# Patient Record
Sex: MALE | Race: WHITE | NOT HISPANIC OR LATINO | Employment: OTHER | RURAL
[De-identification: names, ages, dates, MRNs, and addresses within clinical notes are randomized per-mention and may not be internally consistent; named-entity substitution may affect disease eponyms.]

---

## 2020-10-14 ENCOUNTER — OFFICE VISIT (OUTPATIENT)
Dept: PSYCHIATRY | Facility: CLINIC | Age: 35
End: 2020-10-14

## 2020-10-14 VITALS
DIASTOLIC BLOOD PRESSURE: 79 MMHG | HEART RATE: 102 BPM | SYSTOLIC BLOOD PRESSURE: 134 MMHG | TEMPERATURE: 98.2 F | WEIGHT: 215 LBS | HEIGHT: 73 IN | BODY MASS INDEX: 28.49 KG/M2

## 2020-10-14 DIAGNOSIS — F79 INTELLECTUAL DISABILITY: Primary | ICD-10-CM

## 2020-10-14 DIAGNOSIS — F39 UNSPECIFIED MOOD (AFFECTIVE) DISORDER (HCC): ICD-10-CM

## 2020-10-14 DIAGNOSIS — F51.05 INSOMNIA DUE TO MENTAL CONDITION: ICD-10-CM

## 2020-10-14 PROCEDURE — 90792 PSYCH DIAG EVAL W/MED SRVCS: CPT | Performed by: NURSE PRACTITIONER

## 2020-10-14 RX ORDER — TRAZODONE HYDROCHLORIDE 150 MG/1
150 TABLET ORAL NIGHTLY
COMMUNITY
End: 2020-10-14 | Stop reason: SDUPTHER

## 2020-10-14 RX ORDER — ARIPIPRAZOLE 5 MG/1
5 TABLET ORAL DAILY
Qty: 30 TABLET | Refills: 0 | Status: SHIPPED | OUTPATIENT
Start: 2020-10-14 | End: 2020-10-14 | Stop reason: SDUPTHER

## 2020-10-14 RX ORDER — PANTOPRAZOLE SODIUM 40 MG/1
40 TABLET, DELAYED RELEASE ORAL DAILY
COMMUNITY
End: 2021-01-05 | Stop reason: SDUPTHER

## 2020-10-14 RX ORDER — ARIPIPRAZOLE 10 MG/1
20 TABLET ORAL EVERY EVENING
Qty: 30 TABLET | Refills: 0 | Status: SHIPPED | OUTPATIENT
Start: 2020-10-14 | End: 2020-10-14 | Stop reason: SDUPTHER

## 2020-10-14 RX ORDER — ARIPIPRAZOLE 10 MG/1
TABLET ORAL
Qty: 45 TABLET | Refills: 0 | Status: SHIPPED | OUTPATIENT
Start: 2020-10-14 | End: 2020-10-28 | Stop reason: SDUPTHER

## 2020-10-14 RX ORDER — SERTRALINE HYDROCHLORIDE 100 MG/1
100 TABLET, FILM COATED ORAL 2 TIMES DAILY
COMMUNITY
End: 2020-10-14 | Stop reason: SDUPTHER

## 2020-10-14 RX ORDER — SERTRALINE HYDROCHLORIDE 100 MG/1
100 TABLET, FILM COATED ORAL 2 TIMES DAILY
Qty: 60 TABLET | Refills: 0 | Status: SHIPPED | OUTPATIENT
Start: 2020-10-14 | End: 2020-11-18

## 2020-10-14 RX ORDER — ARIPIPRAZOLE 5 MG/1
5 TABLET ORAL 2 TIMES DAILY
COMMUNITY
End: 2020-10-14 | Stop reason: SDUPTHER

## 2020-10-14 RX ORDER — MONTELUKAST SODIUM 10 MG/1
10 TABLET ORAL NIGHTLY
COMMUNITY
End: 2021-01-05 | Stop reason: SDUPTHER

## 2020-10-14 RX ORDER — EZETIMIBE 10 MG/1
10 TABLET ORAL DAILY
COMMUNITY
End: 2020-10-19 | Stop reason: SDUPTHER

## 2020-10-14 RX ORDER — SIMVASTATIN 10 MG
10 TABLET ORAL NIGHTLY
COMMUNITY
End: 2020-10-19 | Stop reason: SDUPTHER

## 2020-10-14 RX ORDER — TRAZODONE HYDROCHLORIDE 150 MG/1
150 TABLET ORAL NIGHTLY
Qty: 30 TABLET | Refills: 0 | Status: SHIPPED | OUTPATIENT
Start: 2020-10-14 | End: 2020-11-25 | Stop reason: SDUPTHER
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
## 2020-10-19 ENCOUNTER — OFFICE VISIT (OUTPATIENT)
Dept: FAMILY MEDICINE CLINIC | Facility: CLINIC | Age: 35
End: 2020-10-19

## 2020-10-19 ENCOUNTER — TELEPHONE (OUTPATIENT)
Dept: FAMILY MEDICINE CLINIC | Facility: CLINIC | Age: 35
End: 2020-10-19

## 2020-10-19 VITALS
RESPIRATION RATE: 18 BRPM | HEIGHT: 72 IN | DIASTOLIC BLOOD PRESSURE: 90 MMHG | OXYGEN SATURATION: 97 % | HEART RATE: 94 BPM | WEIGHT: 215 LBS | BODY MASS INDEX: 29.12 KG/M2 | SYSTOLIC BLOOD PRESSURE: 142 MMHG | TEMPERATURE: 98.6 F

## 2020-10-19 DIAGNOSIS — J45.990 EXERCISE-INDUCED ASTHMA: ICD-10-CM

## 2020-10-19 DIAGNOSIS — J30.2 SEASONAL ALLERGIES: ICD-10-CM

## 2020-10-19 DIAGNOSIS — E78.2 MIXED HYPERLIPIDEMIA: Primary | ICD-10-CM

## 2020-10-19 DIAGNOSIS — Z13.29 SCREENING FOR THYROID DISORDER: ICD-10-CM

## 2020-10-19 PROCEDURE — 99204 OFFICE O/P NEW MOD 45 MIN: CPT | Performed by: NURSE PRACTITIONER

## 2020-10-19 PROCEDURE — 36415 COLL VENOUS BLD VENIPUNCTURE: CPT | Performed by: NURSE PRACTITIONER

## 2020-10-19 PROCEDURE — 84443 ASSAY THYROID STIM HORMONE: CPT | Performed by: NURSE PRACTITIONER

## 2020-10-19 PROCEDURE — 85025 COMPLETE CBC W/AUTO DIFF WBC: CPT | Performed by: NURSE PRACTITIONER

## 2020-10-19 PROCEDURE — 80053 COMPREHEN METABOLIC PANEL: CPT | Performed by: NURSE PRACTITIONER

## 2020-10-19 PROCEDURE — 80061 LIPID PANEL: CPT | Performed by: NURSE PRACTITIONER

## 2020-10-19 RX ORDER — SIMVASTATIN 10 MG
10 TABLET ORAL NIGHTLY
Qty: 30 TABLET | Refills: 2 | Status: SHIPPED | OUTPATIENT
Start: 2020-10-19 | End: 2021-01-05 | Stop reason: SDUPTHER

## 2020-10-19 RX ORDER — FLUTICASONE PROPIONATE 50 MCG
2 SPRAY, SUSPENSION (ML) NASAL DAILY
Qty: 18.2 ML | Refills: 2 | Status: SHIPPED | OUTPATIENT
Start: 2020-10-19 | End: 2021-01-05 | Stop reason: SDUPTHER

## 2020-10-19 RX ORDER — EZETIMIBE 10 MG/1
10 TABLET ORAL DAILY
Qty: 30 TABLET | Refills: 2 | Status: SHIPPED | OUTPATIENT
Start: 2020-10-19 | End: 2021-01-05 | Stop reason: SDUPTHER

## 2020-10-19 RX ORDER — ALBUTEROL SULFATE 90 UG/1
2 AEROSOL, METERED RESPIRATORY (INHALATION) EVERY 4 HOURS PRN
Qty: 18 G | Refills: 2 | Status: SHIPPED | OUTPATIENT
Start: 2020-10-19 | End: 2021-01-05 | Stop reason: SDUPTHER

## 2020-10-19 NOTE — PROGRESS NOTES
Subjective   Nelson Callahan is a 35 y.o. male.     Chief Complaint   Patient presents with   • Hyperlipidemia       He presents seeking a primary care provider. He states he has had several primary care providers in the past because he has been all over [place]. He presents for initial evaluation of existing diagnoses mixed hyperlipidemia, asthma, and seasonal allergies. He states he has been treated for high cholesterol for about 10 years. He reports good compliance with medications. He denies chest pain and palpitations. He states he has always had asthma. He states he has not had an inhaler or nasal spray for about 6 months. He c/o exercise induced asthma.        The following portions of the patient's history were reviewed and updated as appropriate: allergies, current medications, past family history, past medical history, past social history, past surgical history and problem list.    Review of Systems   Constitutional: Negative for fever and unexpected weight change.   HENT: Negative for ear pain, rhinorrhea, sore throat and trouble swallowing.    Eyes: Negative for visual disturbance.   Respiratory: Positive for cough. Negative for shortness of breath and wheezing.    Cardiovascular: Negative for chest pain and palpitations.   Gastrointestinal: Negative for abdominal pain, blood in stool, constipation, diarrhea, nausea and vomiting.   Genitourinary: Negative for dysuria and hematuria.   Musculoskeletal: Negative for arthralgias and myalgias.   Skin: Negative for color change.   Allergic/Immunologic: Negative for environmental allergies.   Neurological: Negative for dizziness, seizures, syncope and headaches.   Hematological: Negative for adenopathy.   Psychiatric/Behavioral: Negative for sleep disturbance and suicidal ideas. The patient is not nervous/anxious.        Objective     /90 (BP Location: Right arm, Patient Position: Sitting)   Pulse 94   Temp 98.6 °F (37 °C) (Temporal)   Resp 18   Ht  "182.9 cm (72\")   Wt 97.5 kg (215 lb)   SpO2 97%   BMI 29.16 kg/m²     Physical Exam  Vitals signs reviewed.   Constitutional:       General: He is not in acute distress.     Appearance: He is well-developed. He is not diaphoretic.   HENT:      Head: Normocephalic and atraumatic.      Right Ear: Hearing, tympanic membrane, ear canal and external ear normal.      Left Ear: Hearing, tympanic membrane, ear canal and external ear normal.      Nose: Nose normal.      Right Sinus: No maxillary sinus tenderness or frontal sinus tenderness.      Left Sinus: No maxillary sinus tenderness or frontal sinus tenderness.      Mouth/Throat:      Pharynx: Uvula midline.   Eyes:      General: Lids are normal.      Conjunctiva/sclera: Conjunctivae normal.      Pupils: Pupils are equal, round, and reactive to light.   Neck:      Trachea: Trachea normal. No tracheal tenderness or tracheal deviation.   Cardiovascular:      Rate and Rhythm: Normal rate and regular rhythm.      Heart sounds: Normal heart sounds, S1 normal and S2 normal. No murmur. No friction rub. No gallop.    Pulmonary:      Effort: Pulmonary effort is normal. No respiratory distress.      Breath sounds: Normal breath sounds.   Abdominal:      General: Bowel sounds are normal. There is no distension.      Palpations: Abdomen is soft.      Tenderness: There is no abdominal tenderness.   Skin:     General: Skin is warm and dry.   Neurological:      Mental Status: He is alert and oriented to person, place, and time.   Psychiatric:         Behavior: Behavior normal.         Thought Content: Thought content normal.         Judgment: Judgment normal.         Assessment/Plan     Problem List Items Addressed This Visit     None      Visit Diagnoses     Mixed hyperlipidemia    -  Primary    Relevant Medications    ezetimibe (ZETIA) 10 MG tablet    simvastatin (ZOCOR) 10 MG tablet    Other Relevant Orders    CBC & Differential    Comprehensive Metabolic Panel    Lipid Panel    " CBC Auto Differential    Screening for thyroid disorder        Relevant Orders    TSH    Exercise-induced asthma        Relevant Medications    albuterol sulfate  (90 Base) MCG/ACT inhaler    Seasonal allergies        Relevant Medications    fluticasone (Flonase) 50 MCG/ACT nasal spray          Plan: Get baseline labs today. Continue current medications. Albuterol ordered as needed for asthma. Flonase ordered for allergies. Follow up in 6 months and as needed. He declines flu vaccine.    Patient's Body mass index is 29.16 kg/m². BMI is above normal parameters. Recommendations include: educational material.   (Normal BMI:  18.5-24.9, OW 25-29.9, Obesity 30 or greater)    Nelson Callahan  reports that he has been smoking cigarettes. He has a 7.00 pack-year smoking history. He has never used smokeless tobacco.. I have educated him on the risk of diseases from using tobacco products such as cancer, COPD and heart disease.     I advised him to quit and he is not willing to quit.    I spent 1 minutes counseling the patient.           Understands disease processes and need for medications.  Understands reasons for urgent and emergent care.  Patient (& family) verbalized agreement for treatment plan.   Emotional support and active listening provided.  Patient provided time to verbalize feelings.               This document has been electronically signed by CHARLIE Salinas   October 19, 2020 11:28 EDT

## 2020-10-19 NOTE — PATIENT INSTRUCTIONS

## 2020-10-20 LAB
ALBUMIN SERPL-MCNC: 4.7 G/DL (ref 3.5–5.2)
ALBUMIN/GLOB SERPL: 1.7 G/DL
ALP SERPL-CCNC: 44 U/L (ref 39–117)
ALT SERPL W P-5'-P-CCNC: 74 U/L (ref 1–41)
ANION GAP SERPL CALCULATED.3IONS-SCNC: 8.9 MMOL/L (ref 5–15)
AST SERPL-CCNC: 35 U/L (ref 1–40)
BASOPHILS # BLD AUTO: 0.01 10*3/MM3 (ref 0–0.2)
BASOPHILS NFR BLD AUTO: 0.2 % (ref 0–1.5)
BILIRUB SERPL-MCNC: 0.6 MG/DL (ref 0–1.2)
BUN SERPL-MCNC: 9 MG/DL (ref 6–20)
BUN/CREAT SERPL: 10.7 (ref 7–25)
CALCIUM SPEC-SCNC: 9.9 MG/DL (ref 8.6–10.5)
CHLORIDE SERPL-SCNC: 102 MMOL/L (ref 98–107)
CHOLEST SERPL-MCNC: 136 MG/DL (ref 0–200)
CO2 SERPL-SCNC: 30.1 MMOL/L (ref 22–29)
CREAT SERPL-MCNC: 0.84 MG/DL (ref 0.76–1.27)
DEPRECATED RDW RBC AUTO: 43.4 FL (ref 37–54)
EOSINOPHIL # BLD AUTO: 0.02 10*3/MM3 (ref 0–0.4)
EOSINOPHIL NFR BLD AUTO: 0.4 % (ref 0.3–6.2)
ERYTHROCYTE [DISTWIDTH] IN BLOOD BY AUTOMATED COUNT: 11.9 % (ref 12.3–15.4)
GFR SERPL CREATININE-BSD FRML MDRD: 105 ML/MIN/1.73
GLOBULIN UR ELPH-MCNC: 2.8 GM/DL
GLUCOSE SERPL-MCNC: 88 MG/DL (ref 65–99)
HCT VFR BLD AUTO: 46.2 % (ref 37.5–51)
HDLC SERPL-MCNC: 56 MG/DL (ref 40–60)
HGB BLD-MCNC: 16.4 G/DL (ref 13–17.7)
IMM GRANULOCYTES # BLD AUTO: 0.01 10*3/MM3 (ref 0–0.05)
IMM GRANULOCYTES NFR BLD AUTO: 0.2 % (ref 0–0.5)
LDLC SERPL CALC-MCNC: 71 MG/DL (ref 0–100)
LDLC/HDLC SERPL: 1.31 {RATIO}
LYMPHOCYTES # BLD AUTO: 1.28 10*3/MM3 (ref 0.7–3.1)
LYMPHOCYTES NFR BLD AUTO: 23.1 % (ref 19.6–45.3)
MCH RBC QN AUTO: 34.6 PG (ref 26.6–33)
MCHC RBC AUTO-ENTMCNC: 35.5 G/DL (ref 31.5–35.7)
MCV RBC AUTO: 97.5 FL (ref 79–97)
MONOCYTES # BLD AUTO: 0.39 10*3/MM3 (ref 0.1–0.9)
MONOCYTES NFR BLD AUTO: 7 % (ref 5–12)
NEUTROPHILS NFR BLD AUTO: 3.83 10*3/MM3 (ref 1.7–7)
NEUTROPHILS NFR BLD AUTO: 69.1 % (ref 42.7–76)
NRBC BLD AUTO-RTO: 0 /100 WBC (ref 0–0.2)
PLATELET # BLD AUTO: 214 10*3/MM3 (ref 140–450)
PMV BLD AUTO: 10.4 FL (ref 6–12)
POTASSIUM SERPL-SCNC: 5.2 MMOL/L (ref 3.5–5.2)
PROT SERPL-MCNC: 7.5 G/DL (ref 6–8.5)
RBC # BLD AUTO: 4.74 10*6/MM3 (ref 4.14–5.8)
SODIUM SERPL-SCNC: 141 MMOL/L (ref 136–145)
TRIGL SERPL-MCNC: 34 MG/DL (ref 0–150)
TSH SERPL DL<=0.05 MIU/L-ACNC: 0.91 UIU/ML (ref 0.27–4.2)
VLDLC SERPL-MCNC: 9 MG/DL (ref 5–40)
WBC # BLD AUTO: 5.54 10*3/MM3 (ref 3.4–10.8)

## 2020-10-20 NOTE — PROGRESS NOTES
His liver enzymes are slightly elevated. Has he ever had a problem with this before. Otherwise his labs look good.

## 2020-10-21 NOTE — TELEPHONE ENCOUNTER
We can talk about the increase at his next visit with me because 10 mg twice daily is a significant jump in medication.  Thank you

## 2020-10-22 NOTE — TELEPHONE ENCOUNTER
PATIENTS MOM RETURNED CALL, SHE IS NOT ON [facility], SHE WAS NOTIFIED TO HAVE PATIENT RETURN CALL.

## 2020-10-28 ENCOUNTER — TELEPHONE (OUTPATIENT)
Dept: FAMILY MEDICINE CLINIC | Facility: CLINIC | Age: 35
End: 2020-10-28

## 2020-10-28 RX ORDER — ARIPIPRAZOLE 20 MG/1
20 TABLET ORAL NIGHTLY
Qty: 30 TABLET | Refills: 0 | Status: SHIPPED | OUTPATIENT
Start: 2020-10-28 | End: 2020-11-25 | Stop reason: SDUPTHER

## 2020-10-28 NOTE — TELEPHONE ENCOUNTER
PATIENTS MOTHER NAZ CALLED THAT HIS HIS ABLIFY IS STILL MESSED UP; SHE SAID INSURANCE WONT PAY FOR ONE IN THE MORNING, AND HALF IN THE EVENING, THEY WILL ONLY PAY FOR 10 MG OR THE 20 MG    PLEASE ADVISE DONNIE: [phone]      WILBERT ALEJANDRO  [address] [place]

## 2020-11-18 RX ORDER — SERTRALINE HYDROCHLORIDE 100 MG/1
TABLET, FILM COATED ORAL
Qty: 60 TABLET | Refills: 0 | Status: SHIPPED | OUTPATIENT
Start: 2020-11-18 | End: 2020-11-25 | Stop reason: SDUPTHER

## 2020-11-25 ENCOUNTER — OFFICE VISIT (OUTPATIENT)
Dept: PSYCHIATRY | Facility: CLINIC | Age: 35
End: 2020-11-25

## 2020-11-25 VITALS
HEART RATE: 80 BPM | TEMPERATURE: 99 F | DIASTOLIC BLOOD PRESSURE: 90 MMHG | SYSTOLIC BLOOD PRESSURE: 150 MMHG | BODY MASS INDEX: 29.93 KG/M2 | HEIGHT: 72 IN | WEIGHT: 221 LBS

## 2020-11-25 DIAGNOSIS — F51.05 INSOMNIA DUE TO MENTAL CONDITION: ICD-10-CM

## 2020-11-25 DIAGNOSIS — F39 UNSPECIFIED MOOD (AFFECTIVE) DISORDER (HCC): ICD-10-CM

## 2020-11-25 DIAGNOSIS — F79 INTELLECTUAL DISABILITY: Primary | ICD-10-CM

## 2020-11-25 PROCEDURE — 99214 OFFICE O/P EST MOD 30 MIN: CPT | Performed by: NURSE PRACTITIONER

## 2020-11-25 RX ORDER — ARIPIPRAZOLE 20 MG/1
20 TABLET ORAL NIGHTLY
Qty: 30 TABLET | Refills: 1 | Status: SHIPPED | OUTPATIENT
Start: 2020-11-25 | End: 2021-01-13 | Stop reason: SDDI

## 2020-11-25 RX ORDER — SERTRALINE HYDROCHLORIDE 100 MG/1
100 TABLET, FILM COATED ORAL 2 TIMES DAILY
Qty: 60 TABLET | Refills: 1 | Status: SHIPPED | OUTPATIENT
Start: 2020-11-25 | End: 2021-01-13 | Stop reason: SDUPTHER

## 2020-11-25 RX ORDER — TRAZODONE HYDROCHLORIDE 150 MG/1
150 TABLET ORAL NIGHTLY
Qty: 30 TABLET | Refills: 1 | Status: SHIPPED | OUTPATIENT
Start: 2020-11-25 | End: 2021-01-13 | Stop reason: SDUPTHER

## 2020-11-25 NOTE — PROGRESS NOTES
Subjective   Nelson Callahan is a 35 y.o. male is here today for medication management follow-up at [facility].    Chief Complaint: MDD, anxiety, ID    History of Present Illness  He states that he is doing a lot better but that the pharmacy got a little confused about the Abilify dosing, recommended that he cut the Abilify in half and take twice daily.  He states that he is taking his medications as prescribed with on SE as gaining weight with about 6 pound weight gain since last visit.  He states that his appetite is good and that he is eating well.  He rates his depression about 0/10, and anxiety about 3/10 with 10 being the worse, he states that his anxiety only rises when he goes into specific places but it has gotten better.  He states that he is having issues with staying asleep, waking up about every 2 hours, recommended that he attempt to take the trazodone a little later in the evening.  He states that he is getting about 8 hours of sleep per night with no NM.  He denies any acute health issues, he states that he is stressed out about getting people to work with him.  He is trying to get people to work with the Peer support which can be stressful.  Denies any AV hallucinations, denies any SI/HI.      The following portions of the patient's history were reviewed and updated as appropriate: allergies, current medications, past family history, past medical history, past social history, past surgical history and problem list.    Review of Systems   Constitutional: Negative for appetite change, chills, diaphoresis, fatigue, fever and unexpected weight change.   HENT: Negative for hearing loss, sore throat, trouble swallowing and voice change.    Eyes: Negative for photophobia and visual disturbance.   Respiratory: Negative for cough, chest tightness and shortness of breath.    Cardiovascular: Negative for chest pain and palpitations.   Gastrointestinal: Negative for abdominal pain,  "constipation, nausea and vomiting.   Endocrine: Negative for cold intolerance and heat intolerance.   Genitourinary: Negative for dysuria and frequency.   Musculoskeletal: Negative for arthralgias, back pain, joint swelling and neck stiffness.   Skin: Negative for color change and wound.   Allergic/Immunologic: Negative for environmental allergies and immunocompromised state.   Neurological: Negative for dizziness, tremors, seizures, syncope, weakness, light-headedness and headaches.   Hematological: Negative for adenopathy. Does not bruise/bleed easily.       Objective   Physical Exam  Vitals signs reviewed.   Constitutional:       General: He is not in acute distress.     Appearance: He is well-developed.   Neurological:      Mental Status: He is alert.      Coordination: Coordination normal.      Gait: Gait normal.       Blood pressure 150/90, pulse 80, temperature 99 °F (37.2 °C), height 182.9 cm (72\"), weight 100 kg (221 lb). Body mass index is 29.97 kg/m².    Medication List:   Current Outpatient Medications   Medication Sig Dispense Refill   • albuterol sulfate  (90 Base) MCG/ACT inhaler Inhale 2 puffs Every 4 (Four) Hours As Needed for Wheezing. 18 g 2   • ARIPiprazole (ABILIFY) 20 MG tablet Take 1 tablet by mouth Every Night. 30 tablet 1   • ezetimibe (ZETIA) 10 MG tablet Take 1 tablet by mouth Daily. 30 tablet 2   • fluticasone (Flonase) 50 MCG/ACT nasal spray 2 sprays into the nostril(s) as directed by provider Daily. 18.2 mL 2   • montelukast (SINGULAIR) 10 MG tablet Take 10 mg by mouth Every Night.     • pantoprazole (PROTONIX) 40 MG EC tablet Take 40 mg by mouth Daily.     • sertraline (ZOLOFT) 100 MG tablet Take 1 tablet by mouth 2 (Two) Times a Day. 60 tablet 1   • simvastatin (ZOCOR) 10 MG tablet Take 1 tablet by mouth Every Night. 30 tablet 2   • traZODone (DESYREL) 150 MG tablet Take 1 tablet by mouth Every Night. 30 tablet 1     No current facility-administered medications for this visit.  "       Mental Status Exam:   Hygiene:   fair  Cooperation:  Cooperative  Eye Contact:  Fair  Psychomotor Behavior:  Appropriate  Affect:  Appropriate  Hopelessness: Denies  Speech:  Minimal  Thought Process:  Linear  Thought Content:  Mood congruent  Suicidal:  None  Homicidal:  None  Hallucinations:  None  Delusion:  None  Memory:  Intact  Orientation:  Person, Place, Time and Situation  Reliability:  fair  Insight:  Fair  Judgement:  Fair  Impulse Control:  Fair  Physical/Medical Issues:  No     Assessment/Plan   Problems Addressed this Visit     None      Visit Diagnoses     Intellectual disability    -  Primary    Relevant Medications    ARIPiprazole (ABILIFY) 20 MG tablet    sertraline (ZOLOFT) 100 MG tablet    traZODone (DESYREL) 150 MG tablet    Unspecified mood (affective) disorder (CMS/HCC)        Relevant Medications    ARIPiprazole (ABILIFY) 20 MG tablet    sertraline (ZOLOFT) 100 MG tablet    traZODone (DESYREL) 150 MG tablet    Insomnia due to mental condition        Relevant Medications    ARIPiprazole (ABILIFY) 20 MG tablet    sertraline (ZOLOFT) 100 MG tablet    traZODone (DESYREL) 150 MG tablet      Diagnoses       Codes Comments    Intellectual disability    -  Primary ICD-10-CM: F79  ICD-9-CM: 319     Unspecified mood (affective) disorder (CMS/HCC)     ICD-10-CM: F39  ICD-9-CM: 296.90     Insomnia due to mental condition     ICD-10-CM: F51.05  ICD-9-CM: 300.9, 327.02         Discussed medication options. Continue abilify for mood, zoloft for depression and anxiety, and trazodone for sleep.   Reviewed the risks, benefits, and side effects of the medications; patient acknowledged and verbally consented.  Patient is agreeable to call the [facility] with any worsening of symptoms.   Patient is aware to call 911 or go to the nearest ER should begin having SI/HI.     Prognosis: Guarded dependent on medication, follow up appointment and treatment plan compliance     Functionality:  Fair.  Symptoms  have improved with periodic episodes of anxiety when interacting with the public.    Return in 6 weeks.

## 2020-12-21 ENCOUNTER — TELEPHONE (OUTPATIENT)
Dept: FAMILY MEDICINE CLINIC | Facility: CLINIC | Age: 35
End: 2020-12-21

## 2020-12-21 NOTE — TELEPHONE ENCOUNTER
PT STATES THAT THE MEDICATION IS CAUSING HIM TO GAIN WEIGHT.  PT STATES THAT HE GAINED ABOUT 50 POUNDS IN 2 MONTHS    ARIPiprazole (ABILIFY) 20 MG tablet    PT STATES THAT HE WOULD LIKE TO STOP TAKING THAT MEDICATION AND START VYVANSE AND LATUDA.      PHARMACY CONFIRMED  [id number] - [place], [place] - [address] AT [facility] [address] - [phone] PH - [phone] FX      PT STATES THAT IF HE IS NOT AVAILABLE THAT IT IS OK FOR MOTHER NAZ SOLANO TO TALK FOR HIM.      PLEASE ADVISE  [phone]

## 2020-12-22 NOTE — TELEPHONE ENCOUNTER
He can stop the abilify if he would like to but I am not sending in any new medications until I discuss it with him in January.  Plus, unless there is confirmed ADHD he will not be prescribed vyvanse.  Thank you.

## 2020-12-23 ENCOUNTER — TELEPHONE (OUTPATIENT)
Dept: FAMILY MEDICINE CLINIC | Facility: CLINIC | Age: 35
End: 2020-12-23

## 2020-12-23 NOTE — TELEPHONE ENCOUNTER
The Abilify and trazodone shouldn't be causing him to gain that much weight, maybe he is having fluid buildup from somewhere.  He may need to see his PCP should this continue.  Also, he can just discontinue the Abilify until I see him.

## 2020-12-23 NOTE — TELEPHONE ENCOUNTER
"Patient states that he was wanting to see why he would be gaining 5-6 pounds daily. He states that he has decreased his Trazodone and Abilify to \"only taking a crumb.\" States medications makes him \"ill\" as well.   "

## 2020-12-23 NOTE — TELEPHONE ENCOUNTER
Patient is aware to follow up with PCP regarding weight gain. He is also aware to stop the Abilify until he sees you next.

## 2021-01-05 ENCOUNTER — OFFICE VISIT (OUTPATIENT)
Dept: FAMILY MEDICINE CLINIC | Facility: CLINIC | Age: 36
End: 2021-01-05

## 2021-01-05 VITALS
SYSTOLIC BLOOD PRESSURE: 143 MMHG | DIASTOLIC BLOOD PRESSURE: 91 MMHG | HEIGHT: 72 IN | HEART RATE: 96 BPM | OXYGEN SATURATION: 98 % | TEMPERATURE: 98.4 F | BODY MASS INDEX: 31.69 KG/M2 | WEIGHT: 234 LBS | RESPIRATION RATE: 16 BRPM

## 2021-01-05 DIAGNOSIS — K21.9 GASTROESOPHAGEAL REFLUX DISEASE, UNSPECIFIED WHETHER ESOPHAGITIS PRESENT: Primary | Chronic | ICD-10-CM

## 2021-01-05 DIAGNOSIS — E78.2 MIXED HYPERLIPIDEMIA: Chronic | ICD-10-CM

## 2021-01-05 DIAGNOSIS — J45.990 EXERCISE-INDUCED ASTHMA: Chronic | ICD-10-CM

## 2021-01-05 DIAGNOSIS — J30.2 SEASONAL ALLERGIES: Chronic | ICD-10-CM

## 2021-01-05 PROCEDURE — 99214 OFFICE O/P EST MOD 30 MIN: CPT | Performed by: NURSE PRACTITIONER

## 2021-01-05 RX ORDER — PANTOPRAZOLE SODIUM 40 MG/1
40 TABLET, DELAYED RELEASE ORAL DAILY
Qty: 30 TABLET | Refills: 5 | Status: SHIPPED | OUTPATIENT
Start: 2021-01-05 | End: 2021-04-14 | Stop reason: SDUPTHER

## 2021-01-05 RX ORDER — SIMVASTATIN 10 MG
10 TABLET ORAL NIGHTLY
Qty: 30 TABLET | Refills: 2 | Status: SHIPPED | OUTPATIENT
Start: 2021-01-05 | End: 2021-04-14 | Stop reason: SDUPTHER

## 2021-01-05 RX ORDER — FLUTICASONE PROPIONATE 50 MCG
2 SPRAY, SUSPENSION (ML) NASAL DAILY
Qty: 18.2 ML | Refills: 2 | Status: SHIPPED | OUTPATIENT
Start: 2021-01-05 | End: 2021-04-14 | Stop reason: SDUPTHER

## 2021-01-05 RX ORDER — ALBUTEROL SULFATE 90 UG/1
2 AEROSOL, METERED RESPIRATORY (INHALATION) EVERY 4 HOURS PRN
Qty: 18 G | Refills: 2 | Status: SHIPPED | OUTPATIENT
Start: 2021-01-05 | End: 2021-04-14 | Stop reason: SDUPTHER

## 2021-01-05 RX ORDER — MONTELUKAST SODIUM 10 MG/1
10 TABLET ORAL NIGHTLY
Qty: 30 TABLET | Refills: 5 | Status: SHIPPED | OUTPATIENT
Start: 2021-01-05 | End: 2021-03-12 | Stop reason: HOSPADM

## 2021-01-05 RX ORDER — EZETIMIBE 10 MG/1
10 TABLET ORAL DAILY
Qty: 30 TABLET | Refills: 2 | Status: SHIPPED | OUTPATIENT
Start: 2021-01-05 | End: 2021-04-14 | Stop reason: SDUPTHER

## 2021-01-05 NOTE — PROGRESS NOTES
"Subjective   Nelson Callahan is a 35 y.o. male.     Chief Complaint   Patient presents with   • Hyperlipidemia       He presents for follow up of mixed hyperlipidemia, intermittent asthma, and gerd. He reports good tolerance and compliance with medications. He c/o some weight gain related to staying in the house a lot due to the pandemic.        The following portions of the patient's history were reviewed and updated as appropriate: allergies, current medications, past family history, past medical history, past social history, past surgical history and problem list.    Review of Systems   Constitutional: Negative for fever and unexpected weight change.   HENT: Negative for ear pain, rhinorrhea, sore throat and trouble swallowing.    Eyes: Negative for visual disturbance.   Respiratory: Negative for cough, shortness of breath and wheezing.    Cardiovascular: Negative for chest pain and palpitations.   Gastrointestinal: Negative for abdominal pain, blood in stool, constipation, diarrhea, nausea and vomiting.   Genitourinary: Negative for dysuria and hematuria.   Musculoskeletal: Negative for arthralgias and myalgias.   Skin: Negative for color change.   Allergic/Immunologic: Negative for environmental allergies.   Neurological: Negative for dizziness, seizures, syncope and headaches.   Hematological: Negative for adenopathy.   Psychiatric/Behavioral: Negative for sleep disturbance and suicidal ideas. The patient is not nervous/anxious.        Objective     /91 (BP Location: Right arm, Patient Position: Sitting, Cuff Size: Adult)   Pulse 96   Temp 98.4 °F (36.9 °C) (Temporal)   Resp 16   Ht 182.9 cm (72.01\")   Wt 106 kg (234 lb)   SpO2 98%   BMI 31.73 kg/m²     Physical Exam  Vitals signs reviewed.   Constitutional:       General: He is not in acute distress.     Appearance: He is well-developed. He is not diaphoretic.   HENT:      Head: Normocephalic and atraumatic.   Cardiovascular:      Rate and " Rhythm: Normal rate and regular rhythm.      Heart sounds: Normal heart sounds. No murmur. No friction rub. No gallop.    Pulmonary:      Effort: Pulmonary effort is normal. No respiratory distress.      Breath sounds: Normal breath sounds.   Abdominal:      General: Bowel sounds are normal. There is no distension.      Palpations: Abdomen is soft.      Tenderness: There is no abdominal tenderness.   Skin:     General: Skin is warm and dry.   Neurological:      Mental Status: He is alert and oriented to person, place, and time.   Psychiatric:         Behavior: Behavior normal.         Thought Content: Thought content normal.         Judgment: Judgment normal.         Assessment/Plan     Problem List Items Addressed This Visit     None      Visit Diagnoses     Gastroesophageal reflux disease, unspecified whether esophagitis present    -  Primary    Relevant Medications    pantoprazole (PROTONIX) 40 MG EC tablet    Mixed hyperlipidemia        Relevant Medications    simvastatin (ZOCOR) 10 MG tablet    ezetimibe (ZETIA) 10 MG tablet    Exercise-induced asthma        Relevant Medications    albuterol sulfate  (90 Base) MCG/ACT inhaler    montelukast (SINGULAIR) 10 MG tablet    Seasonal allergies        Relevant Medications    fluticasone (Flonase) 50 MCG/ACT nasal spray    montelukast (SINGULAIR) 10 MG tablet          Plan: Continue current medications. Continue social distancing. Keep scheduled follow up. He declines flu vaccine.     Patient's Body mass index is 31.73 kg/m². BMI is above normal parameters. Recommendations include: educational material.   (Normal BMI:  18.5-24.9, OW 25-29.9, Obesity 30 or greater)    Nelson COSME Callahan  reports that he has been smoking cigarettes. He has a 7.00 pack-year smoking history. He has never used smokeless tobacco.. I have educated him on the risk of diseases from using tobacco products such as cancer, COPD and heart disease.     I advised him to quit and he is not  willing to quit.    I spent 1 minutes counseling the patient.           Understands disease processes and need for medications.  Understands reasons for urgent and emergent care.  Patient (& family) verbalized agreement for treatment plan.   Emotional support and active listening provided.  Patient provided time to verbalize feelings.               This document has been electronically signed by CHARLIE Salinas   January 5, 2021 09:41 EST

## 2021-01-05 NOTE — PATIENT INSTRUCTIONS

## 2021-01-13 ENCOUNTER — OFFICE VISIT (OUTPATIENT)
Dept: PSYCHIATRY | Facility: CLINIC | Age: 36
End: 2021-01-13

## 2021-01-13 DIAGNOSIS — F51.05 INSOMNIA DUE TO MENTAL CONDITION: ICD-10-CM

## 2021-01-13 DIAGNOSIS — F79 INTELLECTUAL DISABILITY: Primary | ICD-10-CM

## 2021-01-13 DIAGNOSIS — F41.1 GENERALIZED ANXIETY DISORDER: ICD-10-CM

## 2021-01-13 DIAGNOSIS — F39 UNSPECIFIED MOOD (AFFECTIVE) DISORDER (HCC): ICD-10-CM

## 2021-01-13 PROCEDURE — 99214 OFFICE O/P EST MOD 30 MIN: CPT | Performed by: NURSE PRACTITIONER

## 2021-01-13 RX ORDER — SERTRALINE HYDROCHLORIDE 100 MG/1
100 TABLET, FILM COATED ORAL 2 TIMES DAILY
Qty: 60 TABLET | Refills: 1 | Status: ON HOLD | OUTPATIENT
Start: 2021-01-13 | End: 2021-03-12 | Stop reason: SDUPTHER

## 2021-01-13 RX ORDER — BUPROPION HYDROCHLORIDE 150 MG/1
150 TABLET ORAL EVERY MORNING
Qty: 30 TABLET | Refills: 1 | Status: ON HOLD | OUTPATIENT
Start: 2021-01-13 | End: 2021-03-08

## 2021-01-13 RX ORDER — TRAZODONE HYDROCHLORIDE 150 MG/1
150 TABLET ORAL NIGHTLY
Qty: 30 TABLET | Refills: 1 | Status: SHIPPED | OUTPATIENT
Start: 2021-01-13 | End: 2021-04-14 | Stop reason: SDUPTHER

## 2021-01-13 NOTE — PROGRESS NOTES
You have chosen to receive care through a telephone visit. Do you consent to use a telephone visit for your medical care today? Yes    This provider is located at [facility] at [address] [place] [address]. The provider identified herself as well as her credentials.   The Patient is at home using his phone because problems with video connection. The patient's condition being diagnosed/treated is appropriate for telemedicine. The patient gave consent to be seen remotely, and when consent is given they understand that the consent allows for patient identifiable information to be sent to a third party as needed.   They may refuse to be seen remotely at any time. The electronic data is encrypted and password protected, and the patient has been advised of the potential risks to privacy not withstanding such measures    Subjective   Nelson Callahan is a 35 y.o. male is being interviewed today via telephone as recommended per CDC guidelines associated with Corona Pandemic.    Chief Complaint: MDD, anxiety, ID    History of Present Illness  He states that he is continues to gain weight which is a concern for him.  He states that he feels like he is in a rut and can't seem to get out of it, he questions about the vyvanse but with no ADHD diagnosis this is avoided.  He rates his mood about 2-3/10 with 10 being the worse.  He rates his depression about 3/10, rates his anxiety about 6/10 with 10 being the worse.  He states that he experiences feeling overwhelmed and can't spend time in public.  He states that he is sleeping a little better, he is getting about 8 hours of sleep per night with no NM.  Appetite has increased with reported weight gain, will consider wellbutrin to assist with appetite and depressive symptoms.  Denies any recent health issues.  Denies any acute stressors.  Denies any AV hallucinations, denies any SI/HI.      The following portions of the patient's history were  reviewed and updated as appropriate: allergies, current medications, past family history, past medical history, past social history, past surgical history and problem list.    Review of Systems   Constitutional: Negative for appetite change, chills, diaphoresis, fatigue, fever and unexpected weight change.   HENT: Negative for hearing loss, sore throat, trouble swallowing and voice change.    Eyes: Negative for photophobia and visual disturbance.   Respiratory: Negative for cough, chest tightness and shortness of breath.    Cardiovascular: Negative for chest pain and palpitations.   Gastrointestinal: Negative for abdominal pain, constipation, nausea and vomiting.   Endocrine: Negative for cold intolerance and heat intolerance.   Genitourinary: Negative for dysuria and frequency.   Musculoskeletal: Negative for arthralgias, back pain, joint swelling and neck stiffness.   Skin: Negative for color change and wound.   Allergic/Immunologic: Negative for environmental allergies and immunocompromised state.   Neurological: Negative for dizziness, tremors, seizures, syncope, weakness, light-headedness and headaches.   Hematological: Negative for adenopathy. Does not bruise/bleed easily.       Objective  Unable to assess  Physical Exam  Vitals signs reviewed.   Constitutional:       General: He is not in acute distress.     Appearance: He is well-developed.   Neurological:      Mental Status: He is alert.      Coordination: Coordination normal.      Gait: Gait normal.       There were no vitals taken for this visit. There is no height or weight on file to calculate BMI.    Medication List:   Current Outpatient Medications   Medication Sig Dispense Refill   • albuterol sulfate  (90 Base) MCG/ACT inhaler Inhale 2 puffs Every 4 (Four) Hours As Needed for Wheezing. 18 g 2   • buPROPion XL (Wellbutrin XL) 150 MG 24 hr tablet Take 1 tablet by mouth Every Morning. 30 tablet 1   • ezetimibe (ZETIA) 10 MG tablet Take 1 tablet  by mouth Daily. 30 tablet 2   • fluticasone (Flonase) 50 MCG/ACT nasal spray 2 sprays into the nostril(s) as directed by provider Daily. 18.2 mL 2   • montelukast (SINGULAIR) 10 MG tablet Take 1 tablet by mouth Every Night. 30 tablet 5   • pantoprazole (PROTONIX) 40 MG EC tablet Take 1 tablet by mouth Daily. 30 tablet 5   • sertraline (ZOLOFT) 100 MG tablet Take 1 tablet by mouth 2 (Two) Times a Day. 60 tablet 1   • simvastatin (ZOCOR) 10 MG tablet Take 1 tablet by mouth Every Night. 30 tablet 2   • traZODone (DESYREL) 150 MG tablet Take 1 tablet by mouth Every Night. 30 tablet 1     No current facility-administered medications for this visit.        Mental Status Exam:   Hygiene:   unable to assess  Cooperation:  Cooperative  Eye Contact:  unable to assess  Psychomotor Behavior:  unable to assess  Affect:  unable to assess  Hopelessness: Denies  Speech:  Minimal  Thought Process:  Linear  Thought Content:  Mood congruent  Suicidal:  None  Homicidal:  None  Hallucinations:  None  Delusion:  None  Memory:  Intact  Orientation:  Person, Place, Time and Situation  Reliability:  fair  Insight:  Fair  Judgement:  Fair  Impulse Control:  Fair  Physical/Medical Issues:  No     Assessment/Plan   Problems Addressed this Visit     None      Visit Diagnoses     Intellectual disability    -  Primary    Relevant Medications    sertraline (ZOLOFT) 100 MG tablet    traZODone (DESYREL) 150 MG tablet    buPROPion XL (Wellbutrin XL) 150 MG 24 hr tablet    Insomnia due to mental condition        Relevant Medications    sertraline (ZOLOFT) 100 MG tablet    traZODone (DESYREL) 150 MG tablet    buPROPion XL (Wellbutrin XL) 150 MG 24 hr tablet    Unspecified mood (affective) disorder (CMS/HCC)        Relevant Medications    sertraline (ZOLOFT) 100 MG tablet    traZODone (DESYREL) 150 MG tablet    buPROPion XL (Wellbutrin XL) 150 MG 24 hr tablet    Generalized anxiety disorder        Relevant Medications    sertraline (ZOLOFT) 100 MG  tablet    traZODone (DESYREL) 150 MG tablet    buPROPion XL (Wellbutrin XL) 150 MG 24 hr tablet      Diagnoses       Codes Comments    Intellectual disability    -  Primary ICD-10-CM: F79  ICD-9-CM: 319     Insomnia due to mental condition     ICD-10-CM: F51.05  ICD-9-CM: 300.9, 327.02     Unspecified mood (affective) disorder (CMS/HCC)     ICD-10-CM: F39  ICD-9-CM: 296.90     Generalized anxiety disorder     ICD-10-CM: F41.1  ICD-9-CM: 300.02         Discussed medication options. Continue abilify for mood, zoloft for depression and anxiety, and trazodone for sleep.   Reviewed the risks, benefits, and side effects of the medications; patient acknowledged and verbally consented.  Patient is agreeable to call the [facility] with any worsening of symptoms.   Patient is aware to call 911 or go to the nearest ER should begin having SI/HI.     Prognosis: Guarded dependent on medication, follow up appointment and treatment plan compliance     Functionality:  Fair.  Symptoms have improved with periodic episodes of anxiety when interacting with the public.    Return in 8 weeks.

## 2021-01-14 RX ORDER — TRAZODONE HYDROCHLORIDE 150 MG/1
TABLET ORAL
Qty: 90 TABLET | OUTPATIENT
Start: 2021-01-14

## 2021-03-08 ENCOUNTER — HOSPITAL ENCOUNTER (INPATIENT)
Facility: HOSPITAL | Age: 36
LOS: 4 days | Discharge: HOME | End: 2021-03-12
Attending: PSYCHIATRY & NEUROLOGY | Admitting: PSYCHIATRY & NEUROLOGY

## 2021-03-08 ENCOUNTER — HOSPITAL ENCOUNTER (EMERGENCY)
Facility: HOSPITAL | Age: 36
Discharge: PSYCHIATRIC OR SUBSTANCE USE FACILITY | End: 2021-03-08
Attending: STUDENT IN AN ORGANIZED HEALTH CARE EDUCATION/TRAINING PROGRAM | Admitting: STUDENT IN AN ORGANIZED HEALTH CARE EDUCATION/TRAINING PROGRAM

## 2021-03-08 ENCOUNTER — TELEPHONE (OUTPATIENT)
Dept: FAMILY MEDICINE CLINIC | Facility: CLINIC | Age: 36
End: 2021-03-08

## 2021-03-08 VITALS
HEIGHT: 71 IN | HEART RATE: 96 BPM | OXYGEN SATURATION: 98 % | DIASTOLIC BLOOD PRESSURE: 96 MMHG | WEIGHT: 220 LBS | RESPIRATION RATE: 20 BRPM | BODY MASS INDEX: 30.8 KG/M2 | SYSTOLIC BLOOD PRESSURE: 149 MMHG | TEMPERATURE: 98.7 F

## 2021-03-08 DIAGNOSIS — F29 PSYCHOSIS, UNSPECIFIED PSYCHOSIS TYPE (HCC): Primary | ICD-10-CM

## 2021-03-08 DIAGNOSIS — E78.2 MIXED HYPERLIPIDEMIA: Chronic | ICD-10-CM

## 2021-03-08 LAB
6-ACETYL MORPHINE: NEGATIVE
ALBUMIN SERPL-MCNC: 4.38 G/DL (ref 3.5–5.2)
ALBUMIN/GLOB SERPL: 1.6 G/DL
ALP SERPL-CCNC: 58 U/L (ref 39–117)
ALT SERPL W P-5'-P-CCNC: 22 U/L (ref 1–41)
AMPHET+METHAMPHET UR QL: NEGATIVE
ANION GAP SERPL CALCULATED.3IONS-SCNC: 11.6 MMOL/L (ref 5–15)
AST SERPL-CCNC: 22 U/L (ref 1–40)
BARBITURATES UR QL SCN: NEGATIVE
BASOPHILS # BLD AUTO: 0.02 10*3/MM3 (ref 0–0.2)
BASOPHILS NFR BLD AUTO: 0.4 % (ref 0–1.5)
BENZODIAZ UR QL SCN: NEGATIVE
BILIRUB SERPL-MCNC: 0.5 MG/DL (ref 0–1.2)
BILIRUB UR QL STRIP: NEGATIVE
BUN SERPL-MCNC: 10 MG/DL (ref 6–20)
BUN/CREAT SERPL: 14.9 (ref 7–25)
BUPRENORPHINE SERPL-MCNC: NEGATIVE NG/ML
CALCIUM SPEC-SCNC: 9.3 MG/DL (ref 8.6–10.5)
CANNABINOIDS SERPL QL: NEGATIVE
CHLORIDE SERPL-SCNC: 104 MMOL/L (ref 98–107)
CLARITY UR: CLEAR
CO2 SERPL-SCNC: 22.4 MMOL/L (ref 22–29)
COCAINE UR QL: NEGATIVE
COLOR UR: YELLOW
CREAT SERPL-MCNC: 0.67 MG/DL (ref 0.76–1.27)
DEPRECATED RDW RBC AUTO: 42.3 FL (ref 37–54)
EOSINOPHIL # BLD AUTO: 0.02 10*3/MM3 (ref 0–0.4)
EOSINOPHIL NFR BLD AUTO: 0.4 % (ref 0.3–6.2)
ERYTHROCYTE [DISTWIDTH] IN BLOOD BY AUTOMATED COUNT: 11.9 % (ref 12.3–15.4)
ETHANOL BLD-MCNC: <10 MG/DL (ref 0–10)
ETHANOL UR QL: <0.01 %
FLUAV RNA RESP QL NAA+PROBE: NOT DETECTED
FLUBV RNA RESP QL NAA+PROBE: NOT DETECTED
GFR SERPL CREATININE-BSD FRML MDRD: 136 ML/MIN/1.73
GLOBULIN UR ELPH-MCNC: 2.8 GM/DL
GLUCOSE SERPL-MCNC: 103 MG/DL (ref 65–99)
GLUCOSE UR STRIP-MCNC: NEGATIVE MG/DL
HCT VFR BLD AUTO: 45.2 % (ref 37.5–51)
HGB BLD-MCNC: 15.7 G/DL (ref 13–17.7)
HGB UR QL STRIP.AUTO: NEGATIVE
HOLD SPECIMEN: NORMAL
HOLD SPECIMEN: NORMAL
IMM GRANULOCYTES # BLD AUTO: 0 10*3/MM3 (ref 0–0.05)
IMM GRANULOCYTES NFR BLD AUTO: 0 % (ref 0–0.5)
KETONES UR QL STRIP: NEGATIVE
LEUKOCYTE ESTERASE UR QL STRIP.AUTO: NEGATIVE
LYMPHOCYTES # BLD AUTO: 1.2 10*3/MM3 (ref 0.7–3.1)
LYMPHOCYTES NFR BLD AUTO: 22.2 % (ref 19.6–45.3)
MAGNESIUM SERPL-MCNC: 1.8 MG/DL (ref 1.6–2.6)
MCH RBC QN AUTO: 33.1 PG (ref 26.6–33)
MCHC RBC AUTO-ENTMCNC: 34.7 G/DL (ref 31.5–35.7)
MCV RBC AUTO: 95.2 FL (ref 79–97)
METHADONE UR QL SCN: NEGATIVE
MONOCYTES # BLD AUTO: 0.44 10*3/MM3 (ref 0.1–0.9)
MONOCYTES NFR BLD AUTO: 8.1 % (ref 5–12)
NEUTROPHILS NFR BLD AUTO: 3.72 10*3/MM3 (ref 1.7–7)
NEUTROPHILS NFR BLD AUTO: 68.9 % (ref 42.7–76)
NITRITE UR QL STRIP: NEGATIVE
NRBC BLD AUTO-RTO: 0 /100 WBC (ref 0–0.2)
OPIATES UR QL: NEGATIVE
OXYCODONE UR QL SCN: NEGATIVE
PCP UR QL SCN: NEGATIVE
PH UR STRIP.AUTO: 5.5 [PH] (ref 5–8)
PLATELET # BLD AUTO: 208 10*3/MM3 (ref 140–450)
PMV BLD AUTO: 9.8 FL (ref 6–12)
POTASSIUM SERPL-SCNC: 3.9 MMOL/L (ref 3.5–5.2)
PROT SERPL-MCNC: 7.2 G/DL (ref 6–8.5)
PROT UR QL STRIP: NEGATIVE
RBC # BLD AUTO: 4.75 10*6/MM3 (ref 4.14–5.8)
SARS-COV-2 RNA RESP QL NAA+PROBE: NOT DETECTED
SODIUM SERPL-SCNC: 138 MMOL/L (ref 136–145)
SP GR UR STRIP: 1.01 (ref 1–1.03)
UROBILINOGEN UR QL STRIP: NORMAL
WBC # BLD AUTO: 5.4 10*3/MM3 (ref 3.4–10.8)
WHOLE BLOOD HOLD SPECIMEN: NORMAL
WHOLE BLOOD HOLD SPECIMEN: NORMAL

## 2021-03-08 PROCEDURE — 80307 DRUG TEST PRSMV CHEM ANLYZR: CPT | Performed by: PHYSICIAN ASSISTANT

## 2021-03-08 PROCEDURE — C9803 HOPD COVID-19 SPEC COLLECT: HCPCS

## 2021-03-08 PROCEDURE — 96372 THER/PROPH/DIAG INJ SC/IM: CPT

## 2021-03-08 PROCEDURE — 93005 ELECTROCARDIOGRAM TRACING: CPT | Performed by: PSYCHIATRY & NEUROLOGY

## 2021-03-08 PROCEDURE — 81003 URINALYSIS AUTO W/O SCOPE: CPT | Performed by: PHYSICIAN ASSISTANT

## 2021-03-08 PROCEDURE — 25010000002 ZIPRASIDONE MESYLATE PER 10 MG: Performed by: PHYSICIAN ASSISTANT

## 2021-03-08 PROCEDURE — 83735 ASSAY OF MAGNESIUM: CPT | Performed by: PHYSICIAN ASSISTANT

## 2021-03-08 PROCEDURE — 87636 SARSCOV2 & INF A&B AMP PRB: CPT | Performed by: PHYSICIAN ASSISTANT

## 2021-03-08 PROCEDURE — 82077 ASSAY SPEC XCP UR&BREATH IA: CPT | Performed by: PHYSICIAN ASSISTANT

## 2021-03-08 PROCEDURE — 80053 COMPREHEN METABOLIC PANEL: CPT | Performed by: PHYSICIAN ASSISTANT

## 2021-03-08 PROCEDURE — 93010 ELECTROCARDIOGRAM REPORT: CPT | Performed by: INTERNAL MEDICINE

## 2021-03-08 PROCEDURE — 99285 EMERGENCY DEPT VISIT HI MDM: CPT

## 2021-03-08 PROCEDURE — 85025 COMPLETE CBC W/AUTO DIFF WBC: CPT | Performed by: PHYSICIAN ASSISTANT

## 2021-03-08 RX ORDER — BENZTROPINE MESYLATE 1 MG/1
2 TABLET ORAL ONCE AS NEEDED
Status: DISCONTINUED | OUTPATIENT
Start: 2021-03-08 | End: 2021-03-12 | Stop reason: HOSPADM

## 2021-03-08 RX ORDER — NICOTINE 21 MG/24HR
1 PATCH, TRANSDERMAL 24 HOURS TRANSDERMAL
Status: DISCONTINUED | OUTPATIENT
Start: 2021-03-08 | End: 2021-03-12 | Stop reason: HOSPADM

## 2021-03-08 RX ORDER — PANTOPRAZOLE SODIUM 40 MG/1
40 TABLET, DELAYED RELEASE ORAL DAILY
Status: DISCONTINUED | OUTPATIENT
Start: 2021-03-08 | End: 2021-03-12 | Stop reason: HOSPADM

## 2021-03-08 RX ORDER — BENZTROPINE MESYLATE 1 MG/ML
1 INJECTION INTRAMUSCULAR; INTRAVENOUS ONCE AS NEEDED
Status: DISCONTINUED | OUTPATIENT
Start: 2021-03-08 | End: 2021-03-12 | Stop reason: HOSPADM

## 2021-03-08 RX ORDER — CLONIDINE HYDROCHLORIDE 0.1 MG/1
0.1 TABLET ORAL ONCE
Status: COMPLETED | OUTPATIENT
Start: 2021-03-08 | End: 2021-03-08

## 2021-03-08 RX ORDER — FAMOTIDINE 20 MG/1
20 TABLET, FILM COATED ORAL 2 TIMES DAILY PRN
Status: DISCONTINUED | OUTPATIENT
Start: 2021-03-08 | End: 2021-03-12 | Stop reason: HOSPADM

## 2021-03-08 RX ORDER — HYDROXYZINE 50 MG/1
50 TABLET, FILM COATED ORAL EVERY 6 HOURS PRN
Status: DISCONTINUED | OUTPATIENT
Start: 2021-03-08 | End: 2021-03-09

## 2021-03-08 RX ORDER — ATORVASTATIN CALCIUM 10 MG/1
10 TABLET, FILM COATED ORAL NIGHTLY
Status: DISCONTINUED | OUTPATIENT
Start: 2021-03-08 | End: 2021-03-12 | Stop reason: HOSPADM

## 2021-03-08 RX ORDER — ALBUTEROL SULFATE 90 UG/1
2 AEROSOL, METERED RESPIRATORY (INHALATION) EVERY 4 HOURS PRN
Status: DISCONTINUED | OUTPATIENT
Start: 2021-03-08 | End: 2021-03-12 | Stop reason: HOSPADM

## 2021-03-08 RX ORDER — IBUPROFEN 400 MG/1
400 TABLET ORAL EVERY 6 HOURS PRN
Status: DISCONTINUED | OUTPATIENT
Start: 2021-03-08 | End: 2021-03-12 | Stop reason: HOSPADM

## 2021-03-08 RX ORDER — MONTELUKAST SODIUM 10 MG/1
10 TABLET ORAL NIGHTLY
Status: DISCONTINUED | OUTPATIENT
Start: 2021-03-08 | End: 2021-03-09

## 2021-03-08 RX ORDER — LORAZEPAM 2 MG/ML
2 INJECTION INTRAMUSCULAR EVERY 8 HOURS PRN
Status: DISCONTINUED | OUTPATIENT
Start: 2021-03-08 | End: 2021-03-09

## 2021-03-08 RX ORDER — BENZONATATE 100 MG/1
100 CAPSULE ORAL 3 TIMES DAILY PRN
Status: DISCONTINUED | OUTPATIENT
Start: 2021-03-08 | End: 2021-03-12 | Stop reason: HOSPADM

## 2021-03-08 RX ORDER — EZETIMIBE 10 MG/1
10 TABLET ORAL DAILY
Status: CANCELLED | OUTPATIENT
Start: 2021-03-08

## 2021-03-08 RX ORDER — DIPHENHYDRAMINE HYDROCHLORIDE 50 MG/ML
50 INJECTION INTRAMUSCULAR; INTRAVENOUS EVERY 8 HOURS PRN
Status: DISCONTINUED | OUTPATIENT
Start: 2021-03-08 | End: 2021-03-09

## 2021-03-08 RX ORDER — ECHINACEA PURPUREA EXTRACT 125 MG
2 TABLET ORAL AS NEEDED
Status: DISCONTINUED | OUTPATIENT
Start: 2021-03-08 | End: 2021-03-12 | Stop reason: HOSPADM

## 2021-03-08 RX ORDER — TRAZODONE HYDROCHLORIDE 50 MG/1
150 TABLET ORAL NIGHTLY
Status: DISCONTINUED | OUTPATIENT
Start: 2021-03-08 | End: 2021-03-12 | Stop reason: HOSPADM

## 2021-03-08 RX ORDER — LORAZEPAM 2 MG/1
2 TABLET ORAL ONCE
Status: COMPLETED | OUTPATIENT
Start: 2021-03-08 | End: 2021-03-08

## 2021-03-08 RX ORDER — ONDANSETRON 4 MG/1
4 TABLET, FILM COATED ORAL EVERY 6 HOURS PRN
Status: DISCONTINUED | OUTPATIENT
Start: 2021-03-08 | End: 2021-03-12 | Stop reason: HOSPADM

## 2021-03-08 RX ORDER — ALUMINA, MAGNESIA, AND SIMETHICONE 2400; 2400; 240 MG/30ML; MG/30ML; MG/30ML
15 SUSPENSION ORAL EVERY 6 HOURS PRN
Status: DISCONTINUED | OUTPATIENT
Start: 2021-03-08 | End: 2021-03-12 | Stop reason: HOSPADM

## 2021-03-08 RX ORDER — FLUTICASONE PROPIONATE 50 MCG
2 SPRAY, SUSPENSION (ML) NASAL DAILY
Status: DISCONTINUED | OUTPATIENT
Start: 2021-03-09 | End: 2021-03-12 | Stop reason: HOSPADM

## 2021-03-08 RX ORDER — CLONIDINE HYDROCHLORIDE 0.1 MG/1
0.1 TABLET ORAL EVERY 6 HOURS PRN
Status: DISCONTINUED | OUTPATIENT
Start: 2021-03-08 | End: 2021-03-12 | Stop reason: HOSPADM

## 2021-03-08 RX ORDER — HALOPERIDOL 5 MG/ML
5 INJECTION INTRAMUSCULAR EVERY 8 HOURS PRN
Status: DISCONTINUED | OUTPATIENT
Start: 2021-03-08 | End: 2021-03-09

## 2021-03-08 RX ORDER — LOPERAMIDE HYDROCHLORIDE 2 MG/1
2 CAPSULE ORAL
Status: DISCONTINUED | OUTPATIENT
Start: 2021-03-08 | End: 2021-03-12 | Stop reason: HOSPADM

## 2021-03-08 RX ORDER — TRAZODONE HYDROCHLORIDE 50 MG/1
50 TABLET ORAL NIGHTLY PRN
Status: DISCONTINUED | OUTPATIENT
Start: 2021-03-08 | End: 2021-03-09

## 2021-03-08 RX ADMIN — LORAZEPAM 2 MG: 2 TABLET ORAL at 14:17

## 2021-03-08 RX ADMIN — CLONIDINE HYDROCHLORIDE 0.1 MG: 0.1 TABLET ORAL at 14:59

## 2021-03-08 RX ADMIN — ATORVASTATIN CALCIUM 10 MG: 10 TABLET, FILM COATED ORAL at 20:37

## 2021-03-08 RX ADMIN — SERTRALINE 100 MG: 50 TABLET, FILM COATED ORAL at 20:37

## 2021-03-08 RX ADMIN — MONTELUKAST SODIUM 10 MG: 10 TABLET, COATED ORAL at 20:37

## 2021-03-08 RX ADMIN — ZIPRASIDONE MESYLATE 10 MG: 20 INJECTION, POWDER, LYOPHILIZED, FOR SOLUTION INTRAMUSCULAR at 15:19

## 2021-03-08 RX ADMIN — TRAZODONE HYDROCHLORIDE 150 MG: 50 TABLET ORAL at 20:37

## 2021-03-08 NOTE — NURSING NOTE
Patient agitated at this time. Attempted to walk out of the trillum. Instructed he is going to smoke and also states that everyone is trying to stop him. Pt redirected and escorted back to intake area. Pt restless. ED provider made aware.

## 2021-03-08 NOTE — TELEPHONE ENCOUNTER
PATIENT MOM CALLED AND STATES PATIENT IS TALKING OUT OF HIS HEAD, WHEN YOU HOLLER AT HIM HE ALDA DC. I TOLD HER SHE NEEDED TO TAKE PATIENT TO THE ER AND SHE STATES HE IS REFUSING. I EXPLAINED UNLESS HE IS A DANGER TO HIMSELF OR OTHERS 911 WONT TRANSFER.

## 2021-03-08 NOTE — NURSING NOTE
"Presented to ED along with his mother upon the recommendation of Aziza Arreguin.  Per documentation, his mother called the outpatient provider today and reported that pt has been \"talking out of his head.\"  She reported that when she would call for him, he would reply \"Lucas.\"  It was noted that pt told his mother that he would \"blow his brains out.\"  He did endorse suicidal thoughts to the ED provider, but did not disclose a plan. He also reported auditory hallucinations that do \"not make sense.\"  He is unable to provide information on drug and alcohol use.  Mother reported that he only drinks when he is out, and he does not go out often.  It was reported that pt has an intellectual disability, but serves as his own guardian.  Lives with mother.  Was given medication in ED, and is very drowsy upon admission.  Participation with assessment is limited.  Last admission here was in 2014.   "

## 2021-03-08 NOTE — ED NOTES
Pt ambulated in to triage with comforter wrapped all around him, including over his head, mother states pt started wellbutrin about 5 weeks ago, not sure if related     Fabiana Chan, RN  03/08/21 7294

## 2021-03-08 NOTE — NURSING NOTE
Intake assessment completed. Patient brought in by his mother. She reports that she has been trying to get him in for a week but he would not come. He has been  Very confused, not eating much, talking out of his head for two weeks, not sleeping and actually told her that if he don't get help he is going to blow his brains out. She called  in [place] and he said he called here and that he cant get help in the [facility]. Patient appears very restless, unable to remain focused to complete assessment and very vague. He does endorse SI and says anything to make it all stop. Anxiety and depression 10/10. Patient asked about drug use and all he will say is yes you say it I used it yes I did. Mother states that all she knows of is occasional alcohol when he gets out and she is not aware that he got out anywhere. She is unsure if his meds are working for him or against him.

## 2021-03-08 NOTE — NURSING NOTE
Pt to intake and poor historian. Appears restless. Requested his mother come back to intake for asst with assessment.

## 2021-03-08 NOTE — NURSING NOTE
Called and spoke to Dr. Mackey via phone. Instructed to admit to start prn clonidine and prn dosing meds for agitation. Admit orders received. [id number] patient family and ED provider made aware.

## 2021-03-08 NOTE — ED PROVIDER NOTES
"Subjective   36-year-old male who presents to the ED today with his mother for a mental health evaluation.  His mother states for about 1 week he has been confused and \"talking out of his head.\"  She states it has progressively gotten worse.  The patient states that he has been having suicidal ideations but declines to state his plan.  He denies any homicidal ideations.  His appetite has been normal but his sleep has been very poor.  He states he does use marijuana but denies any other drug use.  He states he drinks alcohol as often as he can.  He cannot tell me the last time he had alcohol.  He states he has been hearing voices and states with a say does not make sense.      History provided by:  Patient and parent  Mental Health Problem  Presenting symptoms: agitation, bizarre behavior, hallucinations and suicidal thoughts    Patient accompanied by:  Parent  Degree of incapacity (severity):  Severe  Onset quality:  Gradual  Duration:  1 week  Timing:  Constant  Progression:  Worsening  Chronicity:  New  Context: alcohol use    Relieved by:  Nothing  Worsened by:  Nothing  Associated symptoms: anxiety, insomnia and irritability    Associated symptoms: no appetite change    Risk factors: hx of mental illness        Review of Systems   Constitutional: Positive for irritability. Negative for appetite change.   HENT: Negative.    Eyes: Negative.    Respiratory: Negative.    Cardiovascular: Negative.    Gastrointestinal: Negative.    Genitourinary: Negative.    Musculoskeletal: Negative.    Skin: Negative.    Neurological: Negative.    Psychiatric/Behavioral: Positive for agitation, dysphoric mood, hallucinations, sleep disturbance and suicidal ideas. The patient is nervous/anxious and has insomnia.    All other systems reviewed and are negative.      Past Medical History:   Diagnosis Date   • Acid reflux    • Alcohol abuse    • Allergies    • Depression    • High cholesterol    • HTN (hypertension)    • Schizoaffective " disorder (CMS/[facility])        Allergies   Allergen Reactions   • Cefaclor Unknown - Low Severity   • Red Dye Unknown - Low Severity   • Sulfamethoxazole-Trimethoprim Unknown - Low Severity       Past Surgical History:   Procedure Laterality Date   • ESOPHAGEAL DILATATION     • GALLBLADDER SURGERY     • HERNIA REPAIR         No family history on file.    Social History     Socioeconomic History   • Marital status: Single     Spouse name: Not on file   • Number of children: Not on file   • Years of education: Not on file   • Highest education level: Not on file   Tobacco Use   • Smoking status: Current Every Day Smoker     Packs/day: 0.50     Years: 14.00     Pack years: 7.00     Types: Cigarettes   • Smokeless tobacco: Never Used   Substance and Sexual Activity   • Alcohol use: Yes     Comment: I dont know   • Drug use: Yes     Comment: yes. poor historian. pt unsure.    • Sexual activity: Not Currently     Partners: Female           Objective   Physical Exam  Vitals and nursing note reviewed.   Constitutional:       General: He is not in acute distress.     Appearance: Normal appearance.   HENT:      Head: Normocephalic and atraumatic.      Right Ear: External ear normal.      Left Ear: External ear normal.   Eyes:      Conjunctiva/sclera: Conjunctivae normal.      Pupils: Pupils are equal, round, and reactive to light.   Cardiovascular:      Rate and Rhythm: Regular rhythm. Tachycardia present.      Pulses: Normal pulses.      Heart sounds: Normal heart sounds.   Pulmonary:      Effort: Pulmonary effort is normal.      Breath sounds: Normal breath sounds.   Abdominal:      General: Bowel sounds are normal.      Palpations: Abdomen is soft.      Tenderness: There is no abdominal tenderness.   Musculoskeletal:         General: Normal range of motion.      Cervical back: Normal range of motion and neck supple.   Skin:     General: Skin is warm and dry.      Capillary Refill: Capillary refill takes less than 2 seconds.    Neurological:      General: No focal deficit present.      Mental Status: He is alert and oriented to person, place, and time.   Psychiatric:         Mood and Affect: Mood is anxious.         Speech: Speech is tangential.         Behavior: Behavior is agitated.         Thought Content: Thought content includes suicidal ideation. Thought content does not include homicidal ideation.         Procedures           ED Course  ED Course as of Mar 08 1600   Mon Mar 08, 2021   1504 Medically clear for psych    [AH]      ED Course User Index  [AH] Shirley Mcmahan PA                                           MDM  Number of Diagnoses or Management Options     Amount and/or Complexity of Data Reviewed  Clinical lab tests: reviewed    Patient Progress  Patient progress: stable      Final diagnoses:   Psychosis, unspecified psychosis type (CMS/HCC)            Shirley Mcmahan PA  03/08/21 1600

## 2021-03-09 PROBLEM — F25.9 SCHIZOAFFECTIVE DISORDER: Status: ACTIVE | Noted: 2021-03-08

## 2021-03-09 LAB
GLUCOSE BLDC GLUCOMTR-MCNC: 95 MG/DL (ref 70–130)
QT INTERVAL: 390 MS
QTC INTERVAL: 429 MS

## 2021-03-09 PROCEDURE — 99223 1ST HOSP IP/OBS HIGH 75: CPT | Performed by: PSYCHIATRY & NEUROLOGY

## 2021-03-09 PROCEDURE — 82962 GLUCOSE BLOOD TEST: CPT

## 2021-03-09 PROCEDURE — 25010000002 DIPHENHYDRAMINE PER 50 MG: Performed by: PSYCHIATRY & NEUROLOGY

## 2021-03-09 PROCEDURE — 25010000002 LORAZEPAM PER 2 MG: Performed by: PSYCHIATRY & NEUROLOGY

## 2021-03-09 PROCEDURE — 25010000002 HALOPERIDOL LACTATE PER 5 MG: Performed by: PSYCHIATRY & NEUROLOGY

## 2021-03-09 RX ORDER — LORAZEPAM 2 MG/ML
2 INJECTION INTRAMUSCULAR EVERY 6 HOURS PRN
Status: DISCONTINUED | OUTPATIENT
Start: 2021-03-09 | End: 2021-03-12 | Stop reason: HOSPADM

## 2021-03-09 RX ORDER — HALOPERIDOL 5 MG/ML
5 INJECTION INTRAMUSCULAR EVERY 6 HOURS PRN
Status: DISCONTINUED | OUTPATIENT
Start: 2021-03-09 | End: 2021-03-12 | Stop reason: HOSPADM

## 2021-03-09 RX ORDER — ARIPIPRAZOLE 10 MG/1
5 TABLET ORAL DAILY
Status: DISCONTINUED | OUTPATIENT
Start: 2021-03-09 | End: 2021-03-12 | Stop reason: HOSPADM

## 2021-03-09 RX ORDER — DIPHENHYDRAMINE HYDROCHLORIDE 50 MG/ML
50 INJECTION INTRAMUSCULAR; INTRAVENOUS EVERY 6 HOURS PRN
Status: DISCONTINUED | OUTPATIENT
Start: 2021-03-09 | End: 2021-03-12 | Stop reason: HOSPADM

## 2021-03-09 RX ORDER — OLANZAPINE 5 MG/1
5 TABLET, ORALLY DISINTEGRATING ORAL EVERY 6 HOURS PRN
Status: DISCONTINUED | OUTPATIENT
Start: 2021-03-09 | End: 2021-03-12 | Stop reason: HOSPADM

## 2021-03-09 RX ADMIN — HALOPERIDOL LACTATE 5 MG: 5 INJECTION, SOLUTION INTRAMUSCULAR at 23:49

## 2021-03-09 RX ADMIN — ARIPIPRAZOLE 5 MG: 10 TABLET ORAL at 13:27

## 2021-03-09 RX ADMIN — NICOTINE TRANSDERMAL SYSTEM 1 PATCH: 21 PATCH, EXTENDED RELEASE TRANSDERMAL at 08:11

## 2021-03-09 RX ADMIN — CLONIDINE HYDROCHLORIDE 0.1 MG: 0.1 TABLET ORAL at 20:23

## 2021-03-09 RX ADMIN — OLANZAPINE 5 MG: 5 TABLET, ORALLY DISINTEGRATING ORAL at 15:58

## 2021-03-09 RX ADMIN — ATORVASTATIN CALCIUM 10 MG: 10 TABLET, FILM COATED ORAL at 21:51

## 2021-03-09 RX ADMIN — PANTOPRAZOLE SODIUM 40 MG: 40 TABLET, DELAYED RELEASE ORAL at 08:11

## 2021-03-09 RX ADMIN — LORAZEPAM 2 MG: 2 INJECTION INTRAMUSCULAR; INTRAVENOUS at 23:49

## 2021-03-09 RX ADMIN — SERTRALINE 100 MG: 50 TABLET, FILM COATED ORAL at 08:10

## 2021-03-09 RX ADMIN — DIPHENHYDRAMINE HYDROCHLORIDE 50 MG: 50 INJECTION, SOLUTION INTRAMUSCULAR; INTRAVENOUS at 23:49

## 2021-03-09 RX ADMIN — CLONIDINE HYDROCHLORIDE 0.1 MG: 0.1 TABLET ORAL at 14:23

## 2021-03-09 RX ADMIN — FLUTICASONE PROPIONATE 2 SPRAY: 50 SPRAY, METERED NASAL at 10:59

## 2021-03-09 NOTE — PLAN OF CARE
Goal Outcome Evaluation:  Plan of Care Reviewed With: patient  Progress: no change   Patient rates anxiety 1 and depression 0, denies thoughts to harm self and others, and denies hallucinations. This afternoon patient was observed agitated cursing notified Dr. Ayala and order received for medications.

## 2021-03-09 NOTE — H&P
"INITIAL PSYCHIATRIC HISTORY & PHYSICAL    Patient Identification:  Name:  @  Age:   36 y.o.  Sex:   male  :   1985  MRN:   [id number]  Visit Number:   [id number]  Primary Care Physician:   Ludmila Dumont APRN    SUBJECTIVE    CC/Focus of Exam: Psychosis/depression/intellectual disability    HPI: Nelson Callahan is a 36 y.o. male who was admitted on 3/8/2021 with complaints of psychosis and depression.    [facility] admission for this 36-year-old single eighth grade educated (marginally literate) Social Security disability recipient (since 20 years old)  male who presented with chief complaint of \"I feel like blowing my brains\".    Informant with the patient and his mother ([phone])    Patient's mother describes that for the 3 to 4 weeks prior to admission patient has gradually gotten more agitated and depressed expressing a sense of hopelessness and being more withdrawn refusing to leave the home.:  \"He will sit constantly moving his hands and rocking back and forth, not sleeping and wanting to rearrange things in the house.\".  She also describes how he has avoided leaving the house and has lost interest in his usual interest and is liked \"paranoid people thinking they are looking and staring at him\".  He is also made statements that he is no good with a sense of guilt that he is done his mother wrong stating that things been hopeless.  He told his mother \"I feel like blowing my brains out\".  She states that he is been talking about Lucas and his relationship with the Lord for the past for 5 days prior to admission, has references his tattoos as being a sign at the devil.  mother states She does have a gun in the house but that that is secured under lock daily, advised she best dispense with this altogether.  The patient is under case management from an agency in [place] ( giovanna Manriquez) and that they had planned for the patient to move out at the mother's " home here in [place] to a trailer to be on his own.      Patient had been seen CHARLIE Arreguin at the [facility] primary care office since this past October, her medications recently have included Zoloft buprenorphine and trazodone.  Please see prior record for further details.  Previous Psych Meds:  Geodon, seroquel, thorazine- poor reactions with these medications.     Available medical/psychiatric records reviewed and incorporated into the current document.     PAST PSYCHIATRIC HX: Patient apparently has been under psychiatric care since at his adolescence receiving the subsequently disability benefits from age 20 onward.  Mother states that he has never before had valente psychotic symptoms such as with his current present illness, has been treated for depression and has been a prior suicide attempt with an overdose of Tylenol perhaps a year ago.  Patient was hospitalized here previously in 2014 with the diagnosis of impulse control disorder.    Substance Abuse: History of ETOH use with MODE about month ago.  Reports that has smoking THC on occasion with MODE about month ago.  Denies any illicit or RX drug abuse.  Caffeine: 36 oz daily. Smoker: 1/2 ppd.    Urine drug screen negative at this admission 70.       Social History: Patient is living in [place] with mother and grandmother.   X 1,  X 1, no current relationship.  Heterosexual orientation with male gender.  No children.  Completed the 8th grade, able to read and comprehend with slight difficult.  Unemployed, received benefits.  No .  No legal issues.  Believes in God.       Family Psychiatric History: Father had emotional issues, mother has depression and anxiety. Sister has attempted suicide several times.    Past Medical History:   Diagnosis Date   • Acid reflux    • Alcohol abuse    • Allergies    • Depression    • High cholesterol    • HTN (hypertension)    • Intellectual disability    • Schizoaffective disorder (CMS/HCC)         Past Surgical History:   Procedure Laterality Date   • ESOPHAGEAL DILATATION     • GALLBLADDER SURGERY     • HERNIA REPAIR         Family History   Family history unknown: Yes         Medications Prior to Admission   Medication Sig Dispense Refill Last Dose   • ezetimibe (ZETIA) 10 MG tablet Take 1 tablet by mouth Daily. 30 tablet 2 3/8/2021 at Unknown time   • fluticasone (Flonase) 50 MCG/ACT nasal spray 2 sprays into the nostril(s) as directed by provider Daily. 18.2 mL 2 3/8/2021 at Unknown time   • montelukast (SINGULAIR) 10 MG tablet Take 1 tablet by mouth Every Night. 30 tablet 5 3/8/2021 at Unknown time   • sertraline (ZOLOFT) 100 MG tablet Take 1 tablet by mouth 2 (Two) Times a Day. 60 tablet 1 3/8/2021 at Unknown time   • albuterol sulfate  (90 Base) MCG/ACT inhaler Inhale 2 puffs Every 4 (Four) Hours As Needed for Wheezing. 18 g 2 Unknown at Unknown time   • pantoprazole (PROTONIX) 40 MG EC tablet Take 1 tablet by mouth Daily. 30 tablet 5 Unknown at Unknown time   • simvastatin (ZOCOR) 10 MG tablet Take 1 tablet by mouth Every Night. 30 tablet 2 Unknown at Unknown time   • traZODone (DESYREL) 150 MG tablet Take 1 tablet by mouth Every Night. 30 tablet 1 Unknown at Unknown time           ALLERGIES:  Cefaclor, Red dye, and Sulfamethoxazole-trimethoprim    Temp:  [97.1 °F (36.2 °C)-98.7 °F (37.1 °C)] 98.5 °F (36.9 °C)  Heart Rate:  [] 95  Resp:  [16-20] 18  BP: (106-184)/() 136/88    REVIEW OF SYSTEMS:  Review of Systems   Constitutional: Negative.    HENT: Negative.    Eyes: Negative.    Respiratory: Negative.    Cardiovascular: Negative.    Gastrointestinal: Negative.    Endocrine: Negative.    Genitourinary: Negative.    Musculoskeletal: Negative.    Skin: Negative.    Allergic/Immunologic:        Deferred   Neurological: Negative.    Hematological: Negative.       See HPI for psychiatric ROS  OBJECTIVE    PHYSICAL EXAM:  Physical Exam  Vitals and nursing note reviewed. Exam  conducted with a chaperone present.   Constitutional:       Appearance: Normal appearance. He is normal weight.   HENT:      Head: Normocephalic and atraumatic.      Right Ear: External ear normal.      Left Ear: External ear normal.      Nose: Nose normal.      Mouth/Throat:      Mouth: Mucous membranes are moist.      Pharynx: Oropharynx is clear.   Eyes:      Extraocular Movements: Extraocular movements intact.      Conjunctiva/sclera: Conjunctivae normal.      Pupils: Pupils are equal, round, and reactive to light.   Cardiovascular:      Rate and Rhythm: Regular rhythm. Tachycardia present.      Heart sounds: Normal heart sounds.   Pulmonary:      Effort: Pulmonary effort is normal.      Breath sounds: Normal breath sounds.   Abdominal:      General: Abdomen is flat. Bowel sounds are normal.      Palpations: Abdomen is soft.   Genitourinary:     Comments: Deferred  Musculoskeletal:         General: Normal range of motion.      Cervical back: Normal range of motion and neck supple.   Skin:     General: Skin is warm and dry.   Neurological:      General: No focal deficit present.      Mental Status: He is alert and oriented to person, place, and time. Mental status is at baseline.         MENTAL STATUS EXAM:               Hygiene:   fair  Cooperation:  Guarded  Eye Contact:  Downcast  Psychomotor Behavior:  Restless  Affect:  Inappropriate  Hopelessness: Denies  Speech:  Monotone  Thought Progress:  Disorganized and Tangential  Thought Content:  Bizarre  Suicidal:  Denied at this time but has made statements regarding his intent to commit suicide with a gun  Homicidal:  None  Hallucinations:  Auditory  Delusion:  Paranoid  Memory:  Intact  Orientation:  Person and Place  Reliability:  poor  Insight:  Poor  Judgement:  Poor  Impulse Control:  Fair      Imaging Results (Last 24 Hours)     ** No results found for the last 24 hours. **           ECG/EMG Results (most recent)     Procedure Component Value Units  Date/Time    ECG 12 Lead [[id number]] Collected: 03/08/21 1953     Updated: 03/08/21 1957     QT Interval 390 ms      QTC Interval 429 ms     Narrative:      Test Reason : Baseline Cardiac Status  Blood Pressure : **/** mmHG  Vent. Rate : 073 BPM     Atrial Rate : 073 BPM     P-R Int : 162 ms          QRS Dur : 106 ms      QT Int : 390 ms       P-R-T Axes : 044 063 036 degrees     QTc Int : 429 ms    Normal sinus rhythm  Normal ECG  No previous ECGs available    Referred By:  FRANKIE           Confirmed By:            Lab Results   Component Value Date    GLUCOSE 103 (H) 03/08/2021    BUN 10 03/08/2021    CREATININE 0.67 (L) 03/08/2021    EGFRIFNONA 136 03/08/2021    BCR 14.9 03/08/2021    CO2 22.4 03/08/2021    CALCIUM 9.3 03/08/2021    ALBUMIN 4.38 03/08/2021    LABIL2 1.5 06/11/2014    AST 22 03/08/2021    ALT 22 03/08/2021       Lab Results   Component Value Date    WBC 5.40 03/08/2021    HGB 15.7 03/08/2021    HCT 45.2 03/08/2021    MCV 95.2 03/08/2021     03/08/2021       Pain Management Panel     Pain Management Panel Latest Ref Rng & Units 3/8/2021 6/11/2014    AMPHETAMINES SCREEN, URINE Negative Negative Negative    BARBITURATES SCREEN Negative Negative Negative    BENZODIAZEPINE SCREEN, URINE Negative Negative Negative    BUPRENORPHINEUR Negative Negative -    COCAINE SCREEN, URINE Negative Negative Negative    METHADONE SCREEN, URINE Negative Negative Negative          Brief Urine Lab Results  (Last result in the past 365 days)      Color   Clarity   Blood   Leuk Est   Nitrite   Protein   CREAT   Urine HCG        03/08/21 1403 Yellow Clear Negative Negative Negative Negative               Reviewed labs and studies done with this admission.       ASSESSMENT & PLAN:        Schizoaffective disorder (CMS/HCC)  Will add in Abilify and consider possible in Depo format in the future while continuing the sertraline, patient is on special precautions, will provide for supportive psychotherapeutic effort both  individual and group    Intellectual disability  Will incorporate into the treatment approach and psychotherapy    HTN (hypertension)  We will monitor blood pressure prospectively    Acid reflux  Protonix    High cholesterol  Lipitor       The patient has been admitted for safety and stabilization.  Patient will be monitored for suicidality daily and maintained on Special Precautions Level 3 (q15 min checks) . The patient will have individual and group therapy with a master's level therapist. A master treatment plan will be developed and agreed upon by the patient and his/her treatment team.  The patient's estimated length of stay in the hospital is 5-7 days.       I spent a total of 70 minutes in direct patient care, greater than 40 minutes (greater than 50%) were spent face-to-face with assessment, coordination of care, counseling,  and answering any questions the patient had regarding his status and the treatment plan.  Also interviewed his mother per phone.    ELLI Ayala MD    03/09/21  12:00 PM EST
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
## 2021-03-10 LAB
T4 FREE SERPL-MCNC: 1.04 NG/DL (ref 0.93–1.7)
TSH SERPL DL<=0.05 MIU/L-ACNC: 1.51 UIU/ML (ref 0.27–4.2)

## 2021-03-10 PROCEDURE — 99232 SBSQ HOSP IP/OBS MODERATE 35: CPT | Performed by: PSYCHIATRY & NEUROLOGY

## 2021-03-10 PROCEDURE — 84443 ASSAY THYROID STIM HORMONE: CPT | Performed by: PSYCHIATRY & NEUROLOGY

## 2021-03-10 PROCEDURE — 84439 ASSAY OF FREE THYROXINE: CPT | Performed by: PSYCHIATRY & NEUROLOGY

## 2021-03-10 RX ORDER — LISINOPRIL 10 MG/1
10 TABLET ORAL
Status: DISCONTINUED | OUTPATIENT
Start: 2021-03-10 | End: 2021-03-10

## 2021-03-10 RX ORDER — METOPROLOL SUCCINATE 25 MG/1
12.5 TABLET, EXTENDED RELEASE ORAL
Status: DISCONTINUED | OUTPATIENT
Start: 2021-03-10 | End: 2021-03-11

## 2021-03-10 RX ADMIN — CLONIDINE HYDROCHLORIDE 0.1 MG: 0.1 TABLET ORAL at 20:29

## 2021-03-10 RX ADMIN — SERTRALINE 100 MG: 50 TABLET, FILM COATED ORAL at 20:29

## 2021-03-10 RX ADMIN — ARIPIPRAZOLE 5 MG: 10 TABLET ORAL at 09:39

## 2021-03-10 RX ADMIN — ATORVASTATIN CALCIUM 10 MG: 10 TABLET, FILM COATED ORAL at 20:29

## 2021-03-10 RX ADMIN — METOPROLOL SUCCINATE 12.5 MG: 25 TABLET, EXTENDED RELEASE ORAL at 12:13

## 2021-03-10 RX ADMIN — NICOTINE TRANSDERMAL SYSTEM 1 PATCH: 21 PATCH, EXTENDED RELEASE TRANSDERMAL at 09:39

## 2021-03-10 RX ADMIN — PANTOPRAZOLE SODIUM 40 MG: 40 TABLET, DELAYED RELEASE ORAL at 09:39

## 2021-03-10 RX ADMIN — METOPROLOL TARTRATE 25 MG: 25 TABLET, FILM COATED ORAL at 21:58

## 2021-03-10 RX ADMIN — TRAZODONE HYDROCHLORIDE 150 MG: 50 TABLET ORAL at 20:29

## 2021-03-10 RX ADMIN — FLUTICASONE PROPIONATE 2 SPRAY: 50 SPRAY, METERED NASAL at 09:39

## 2021-03-10 RX ADMIN — SERTRALINE 100 MG: 50 TABLET, FILM COATED ORAL at 09:39

## 2021-03-10 NOTE — PLAN OF CARE
Goal Outcome Evaluation:  Plan of Care Reviewed With: patient  Progress: no change  Outcome Summary: Patient calm and cooperative. Patient avoids social interactions. Rates anxiety a 10. Denies SI/HI or AVH.

## 2021-03-10 NOTE — NURSING NOTE
"Pt laying on floor in dayroom, he laid himself on floor. He placed his hands behind his back screaming \"Take me to custodial! So I can get some treatment!\" Pt had been laying head on table in day room and we kept encouraging him to go to his room and lay down occasionally to prevent him from falling. Pt refused po meds during med pass, very paranoid. He made me read every single side effect of clonidine for his elevated bp before agreeing to take it. Called and spoke with Dr. Davis, new orders given due to pt increasingly agitated and unable to redirect and security called for assistance to guide patient back to room. Pt did walk to room. Pt was medicated per md orders, tolerated well. Pt currently resting in bed eyes closed, vs stable. Will continue to monitor.   "

## 2021-03-10 NOTE — PROGRESS NOTES
"INPATIENT PSYCHIATRIC PROGRESS NOTE    Name:  Nelson Callahan  :  1985  MRN:  [id number]  Visit Number:  [id number]  Length of stay:  2    Behavioral Health Treatment Plan and Problem List: I have reviewed and approved the Behavioral Health Treatment Plan and Problem list.    SUBJECTIVE    CC/ Focus of exam: depression/ psychosis/intellectual limitations.     Patient's subjective status: \"Hopeful\"     INTERVAL HISTORY: Very difficult to clarify the exact status of the patient for his responses to questions are tangential at best.  Had an episode of agitation associated with paranoia yesterday afternoon, see nursing notes.  Patient appears distressed making statements such as \"I need to grow up and stop being selfish, I do not want to talk to anybody but my mother\".  States he is depressed and wishes he were \"out in the sun and going fishing but nobody calls\".   Social isolation seems to be at least in part an issue.  Mother had reported that patient had isolated himself in his bedroom for the several weeks prior to admission.    Depression rating unable to rate   anxiety rating able to rate   sleep: Slept through the night     Review of Systems   Respiratory: Negative.    Cardiovascular: Negative.    Gastrointestinal: Negative.    Neurological: Negative.      Patient's blood pressure and tachycardia an issue, will start with the beta-blocker.    OBJECTIVE    Temp:  [97.3 °F (36.3 °C)-98.7 °F (37.1 °C)] 97.8 °F (36.6 °C)  Heart Rate:  [] 96  Resp:  [18] 18  BP: (128-169)/() 147/94    MENTAL STATUS EXAM:        Psychomotor: No psychomotor agitation/retardation, No EPS, No motor tics  Speech-normal rate, amount.  Mood/Affect:  Depressed  Thought Processes:  tangential  Thought Content:  bizarre  Hallucination(s): Cannot be certain about this, patient will not clarify and had been observed apparently experiencing external stimuli that were not there on the unit yesterday.  Hopelessness: \"I have " "hope by the next person for the future\"  Optimistic:minimally  Suicidal Thoughts:   No  Suicidal Plan/Intent:  No  Homicidal Thoughts:  absent  Orientation: oriented x 3  Memory: recent intact    Lab Results (last 24 hours)     Procedure Component Value Units Date/Time    POC Glucose Once [[id number]]  (Normal) Collected: 03/09/21 2012    Specimen: Blood Updated: 03/09/21 2019     Glucose 95 mg/dL            Imaging Results (Last 24 Hours)     ** No results found for the last 24 hours. **           ECG/EMG Results (most recent)     Procedure Component Value Units Date/Time    ECG 12 Lead [[id number]] Collected: 03/08/21 1953     Updated: 03/09/21 1235     QT Interval 390 ms      QTC Interval 429 ms     Narrative:      Test Reason : Baseline Cardiac Status  Blood Pressure : **/** mmHG  Vent. Rate : 073 BPM     Atrial Rate : 073 BPM     P-R Int : 162 ms          QRS Dur : 106 ms      QT Int : 390 ms       P-R-T Axes : 044 063 036 degrees     QTc Int : 429 ms    Normal sinus rhythm  Normal ECG  No previous ECGs available  Confirmed by Mirta Olivas ([id number]) on 3/9/2021 12:34:57 PM    Referred By:  FRANKIE           Confirmed By:Mirta Olivas           ALLERGIES: Cefaclor, Red dye, and Sulfamethoxazole-trimethoprim      Current Facility-Administered Medications:   •  albuterol sulfate HFA (PROVENTIL HFA;VENTOLIN HFA;PROAIR HFA) inhaler 2 puff, 2 puff, Inhalation, Q4H PRN, Arian Mackey MD  •  aluminum-magnesium hydroxide-simethicone (MAALOX MAX) 400-400-40 MG/5ML suspension 15 mL, 15 mL, Oral, Q6H PRN, Arian Mackey MD  •  ARIPiprazole (ABILIFY) tablet 5 mg, 5 mg, Oral, Daily, Nathan Ayala MD, 5 mg at 03/10/21 0939  •  atorvastatin (LIPITOR) tablet 10 mg, 10 mg, Oral, Nightly, Arian Mackey MD, 10 mg at 03/09/21 2151  •  benzonatate (TESSALON) capsule 100 mg, 100 mg, Oral, TID PRN, Arian Mackey MD  •  benztropine (COGENTIN) tablet 2 mg, 2 mg, Oral, Once PRN **OR** benztropine (COGENTIN) " injection 1 mg, 1 mg, Intramuscular, Once PRN, Arian Mackey MD  •  cloNIDine (CATAPRES) tablet 0.1 mg, 0.1 mg, Oral, Q6H PRN, Arian Mackey MD, 0.1 mg at 03/09/21 2023  •  diphenhydrAMINE (BENADRYL) injection 50 mg, 50 mg, Intramuscular, Q6H PRN, Alton Davis MD, 50 mg at 03/09/21 2349  •  famotidine (PEPCID) tablet 20 mg, 20 mg, Oral, BID PRN, Arian Mackey MD  •  fluticasone (FLONASE) 50 MCG/ACT nasal spray 2 spray, 2 spray, Nasal, Daily, Arian Mackey MD, 2 spray at 03/10/21 0939  •  haloperidol lactate (HALDOL) injection 5 mg, 5 mg, Intramuscular, Q6H PRN, Alton Davis MD, 5 mg at 03/09/21 2349  •  ibuprofen (ADVIL,MOTRIN) tablet 400 mg, 400 mg, Oral, Q6H PRN, Arian Mackey MD  •  loperamide (IMODIUM) capsule 2 mg, 2 mg, Oral, Q2H PRN, Arian Mackey MD  •  LORazepam (ATIVAN) injection 2 mg, 2 mg, Intramuscular, Q6H PRN, Alton Davis MD, 2 mg at 03/09/21 2349  •  magnesium hydroxide (MILK OF MAGNESIA) suspension 2400 mg/10mL 10 mL, 10 mL, Oral, Daily PRN, Arian Mackey MD  •  nicotine (NICODERM CQ) 21 MG/24HR patch 1 patch, 1 patch, Transdermal, Q24H, Arian Mackey MD, 1 patch at 03/10/21 0939  •  OLANZapine zydis (zyPREXA) disintegrating tablet 5 mg, 5 mg, Oral, Q6H PRN, Nathan Ayala MD, 5 mg at 03/09/21 1558  •  ondansetron (ZOFRAN) tablet 4 mg, 4 mg, Oral, Q6H PRN, Arian Mackey MD  •  pantoprazole (PROTONIX) EC tablet 40 mg, 40 mg, Oral, Daily, Arian Mackey MD, 40 mg at 03/10/21 0939  •  sertraline (ZOLOFT) tablet 100 mg, 100 mg, Oral, BID, Arian Mackey MD, 100 mg at 03/10/21 0939  •  sodium chloride nasal spray 2 spray, 2 spray, Each Nare, PRN, Arian Mackey MD  •  traZODone (DESYREL) tablet 150 mg, 150 mg, Oral, Nightly, Arian Mackey MD, 150 mg at 03/08/21 2037    ASSESSMENT & PLAN     Schizoaffective disorder (CMS/[facility])   Abilify and consider possible in Depo format in the future while continuing the sertraline, patient is  on special precautions, will provide for supportive psychotherapeutic effort both individual and group    Intellectual disability  Will incorporate into the treatment approach and psychotherapy    HTN (hypertension)  We will monitor blood pressure prospectively    Acid reflux  Protonix    High cholesterol  Lipitor       Special precautions: Special Precautions Level 3 (q15 min checks)     Behavioral Health Treatment Plan and Problem List: I have reviewed and approved the Behavioral Health Treatment Plan and Problem list.    I spent a total of 26 minutes in direct patient care including 18 minutes face to face with the patient assessment, coordination of care, and counseling the patient on the current and follow-up treatment plans regarding his status.  Patient had no additional questions.    ELLI Ayala MD    Clinician:  Nathan Ayala MD  03/10/21  10:17 EST    Dictated utilizing Dragon dictation

## 2021-03-10 NOTE — PLAN OF CARE
"  Problem: Adult Behavioral Health Plan of Care  Goal: Optimized Coping Skills in Response to Life Stressors  Intervention: Promote Effective Coping Strategies  Flowsheets (Taken 3/10/2021 0900 by Angelique Albrecht RN)  Supportive Measures:  • active listening utilized  • verbalization of feelings encouraged     Problem: Adult Behavioral Health Plan of Care  Goal: Develops/Participates in Therapeutic [place] to Support Successful Transition  Intervention: Foster Therapeutic [place]  Flowsheets (Taken 3/10/2021 1424)  Trust Relationship/Rapport:  • care explained  • choices provided  • emotional support provided  • empathic listening provided  • questions answered  • questions encouraged  • thoughts/feelings acknowledged  • reassurance provided     Problem: Adult Behavioral Health Plan of Care  Goal: Develops/Participates in Therapeutic [place] to Support Successful Transition  Intervention: Mutually Develop Transition Plan  Flowsheets (Taken 3/10/2021 1424)  Transition Support:  • community resources reviewed  • crisis management plan promoted  • crisis management plan verbalized  • follow-up care discussed   Goal Outcome Evaluation:    Therapist staffed case with Nursing staff this date. The patient has been withdrawn to his room today. Last night the patient became very agitated and paranoid. Patient also was laying in the floor screaming \"take me to skilled nursing\". Patient also laying in the day room on the group table. Security was called to the unit and the patient was given some PRN medications to help calm him down.    Patient is unable to participate in individual therapy this date.           "

## 2021-03-11 PROCEDURE — 25010000002 HALOPERIDOL LACTATE PER 5 MG: Performed by: PSYCHIATRY & NEUROLOGY

## 2021-03-11 PROCEDURE — 25010000002 DIPHENHYDRAMINE PER 50 MG: Performed by: PSYCHIATRY & NEUROLOGY

## 2021-03-11 PROCEDURE — 25010000002 LORAZEPAM PER 2 MG: Performed by: PSYCHIATRY & NEUROLOGY

## 2021-03-11 RX ORDER — METOPROLOL SUCCINATE 25 MG/1
12.5 TABLET, EXTENDED RELEASE ORAL ONCE
Status: COMPLETED | OUTPATIENT
Start: 2021-03-11 | End: 2021-03-11

## 2021-03-11 RX ORDER — METOPROLOL SUCCINATE 25 MG/1
25 TABLET, EXTENDED RELEASE ORAL
Status: DISCONTINUED | OUTPATIENT
Start: 2021-03-12 | End: 2021-03-12 | Stop reason: HOSPADM

## 2021-03-11 RX ADMIN — FLUTICASONE PROPIONATE 2 SPRAY: 50 SPRAY, METERED NASAL at 09:37

## 2021-03-11 RX ADMIN — TRAZODONE HYDROCHLORIDE 150 MG: 50 TABLET ORAL at 20:28

## 2021-03-11 RX ADMIN — METOPROLOL SUCCINATE 12.5 MG: 25 TABLET, EXTENDED RELEASE ORAL at 11:39

## 2021-03-11 RX ADMIN — DIPHENHYDRAMINE HYDROCHLORIDE 50 MG: 50 INJECTION, SOLUTION INTRAMUSCULAR; INTRAVENOUS at 19:17

## 2021-03-11 RX ADMIN — ATORVASTATIN CALCIUM 10 MG: 10 TABLET, FILM COATED ORAL at 20:28

## 2021-03-11 RX ADMIN — NICOTINE POLACRILEX 2 MG: 2 GUM, CHEWING BUCCAL at 18:01

## 2021-03-11 RX ADMIN — METOPROLOL SUCCINATE 12.5 MG: 25 TABLET, EXTENDED RELEASE ORAL at 09:37

## 2021-03-11 RX ADMIN — ARIPIPRAZOLE 5 MG: 10 TABLET ORAL at 09:37

## 2021-03-11 RX ADMIN — HALOPERIDOL LACTATE 5 MG: 5 INJECTION, SOLUTION INTRAMUSCULAR at 19:17

## 2021-03-11 RX ADMIN — LORAZEPAM 2 MG: 2 INJECTION INTRAMUSCULAR; INTRAVENOUS at 19:17

## 2021-03-11 RX ADMIN — SERTRALINE 100 MG: 50 TABLET, FILM COATED ORAL at 09:37

## 2021-03-11 RX ADMIN — CLONIDINE HYDROCHLORIDE 0.1 MG: 0.1 TABLET ORAL at 20:28

## 2021-03-11 RX ADMIN — NICOTINE TRANSDERMAL SYSTEM 1 PATCH: 21 PATCH, EXTENDED RELEASE TRANSDERMAL at 09:38

## 2021-03-11 RX ADMIN — PANTOPRAZOLE SODIUM 40 MG: 40 TABLET, DELAYED RELEASE ORAL at 09:37

## 2021-03-11 RX ADMIN — SERTRALINE 100 MG: 50 TABLET, FILM COATED ORAL at 20:28

## 2021-03-11 NOTE — PLAN OF CARE
"  Problem: Adult Behavioral Health Plan of Care  Goal: Optimized Coping Skills in Response to Life Stressors  Intervention: Promote Effective Coping Strategies  Flowsheets (Taken 3/11/2021 9035)  Supportive Measures:  • active listening utilized  • counseling provided  • decision-making supported  • goal setting facilitated  • self-care encouraged  • relaxation techniques promoted  • verbalization of feelings encouraged  • self-responsibility promoted  • positive reinforcement provided  • problem-solving facilitated     Problem: Adult Behavioral Health Plan of Care  Goal: Develops/Participates in Therapeutic [place] to Support Successful Transition  Intervention: Foster Therapeutic [place]  Flowsheets (Taken 3/11/2021 6907)  Trust Relationship/Rapport:  • care explained  • choices provided  • emotional support provided  • empathic listening provided  • questions answered  • questions encouraged  • thoughts/feelings acknowledged  • reassurance provided     Problem: Adult Behavioral Health Plan of Care  Goal: Develops/Participates in Therapeutic [place] to Support Successful Transition  Intervention: Mutually Develop Transition Plan  Flowsheets (Taken 3/10/2021 8947)  Transition Support:  • community resources reviewed  • crisis management plan promoted  • crisis management plan verbalized  • follow-up care discussed   Goal Outcome Evaluation:    Therapist met with the patient in his room. I asked him how he is doing and he responded \" I'm relaxing in my own way\". Patient is not able to have much of a conversation and has minimal progress today. Most questions he responds by saying \"I don't know\".  When I asked him to rate his depression he said \"what do you mean\"?      Talked with patient's mother and provided her with an update. She asked what medications the patient was taking. When I told her that he was taking Abilify she said that the patient has been on this in the past and not done well. She stated that he " "started to urinate on himself and had issues with his kidneys. She stated that he was on Geodon in the past and did well. Informed the patient's mother that the patient's  has called and left a message. The patient does not want me to contact his , John Manriquez right now. The patient's mother stated that the patient is \"mad at men right now\"; Thinks that they all beat their wives. Mother stated that the patient called her last night and mother stated that he told her that she should be in his shoes and then hung up mad.     "

## 2021-03-11 NOTE — NURSING NOTE
Contacted on call provider Dr. Dequan Ayala provided patient current v/s and medications. See orders for metoprolol 25mg oral one time order. [id number]

## 2021-03-11 NOTE — NURSING NOTE
Report received from Roseanna LOUIS at this time to assist with Shift assessment.  All assessment will be reported to Roseanna to Resume Primary care of pt.

## 2021-03-11 NOTE — PLAN OF CARE
Goal Outcome Evaluation:  Plan of Care Reviewed With: patient  Progress: improving  Outcome Summary: Pt calm and cooperative. Rates anxiety 5 and depression 3. Denies SI, HI and AVH. Pt avoiding social interactions.

## 2021-03-11 NOTE — PLAN OF CARE
Goal Outcome Evaluation:  Plan of Care Reviewed With: patient  Progress: no change  Outcome Summary: Patient calm and cooperative. Patient avoids social interactions. Rates anxiety a 5 and depression a 3. Denies SI/HI or AVH.

## 2021-03-12 VITALS
SYSTOLIC BLOOD PRESSURE: 130 MMHG | HEART RATE: 98 BPM | DIASTOLIC BLOOD PRESSURE: 63 MMHG | RESPIRATION RATE: 18 BRPM | TEMPERATURE: 97.8 F | OXYGEN SATURATION: 97 %

## 2021-03-12 PROCEDURE — 99239 HOSP IP/OBS DSCHRG MGMT >30: CPT | Performed by: PSYCHIATRY & NEUROLOGY

## 2021-03-12 RX ORDER — ARIPIPRAZOLE 5 MG/1
5 TABLET ORAL DAILY
Qty: 30 TABLET | Refills: 0 | Status: SHIPPED | OUTPATIENT
Start: 2021-03-13 | End: 2021-04-06 | Stop reason: SDUPTHER

## 2021-03-12 RX ORDER — SERTRALINE HYDROCHLORIDE 100 MG/1
100 TABLET, FILM COATED ORAL 2 TIMES DAILY
Qty: 60 TABLET | Refills: 0 | Status: SHIPPED | OUTPATIENT
Start: 2021-03-12 | End: 2021-04-14 | Stop reason: SDUPTHER

## 2021-03-12 RX ORDER — METOPROLOL SUCCINATE 25 MG/1
25 TABLET, EXTENDED RELEASE ORAL
Qty: 30 TABLET | Refills: 0 | Status: SHIPPED | OUTPATIENT
Start: 2021-03-13 | End: 2021-04-06 | Stop reason: SDUPTHER

## 2021-03-12 RX ADMIN — SERTRALINE 100 MG: 50 TABLET, FILM COATED ORAL at 09:10

## 2021-03-12 RX ADMIN — NICOTINE TRANSDERMAL SYSTEM 1 PATCH: 21 PATCH, EXTENDED RELEASE TRANSDERMAL at 09:10

## 2021-03-12 RX ADMIN — PANTOPRAZOLE SODIUM 40 MG: 40 TABLET, DELAYED RELEASE ORAL at 09:10

## 2021-03-12 RX ADMIN — METOPROLOL SUCCINATE 25 MG: 25 TABLET, EXTENDED RELEASE ORAL at 09:14

## 2021-03-12 RX ADMIN — ARIPIPRAZOLE 5 MG: 10 TABLET ORAL at 09:10

## 2021-03-12 RX ADMIN — FLUTICASONE PROPIONATE 2 SPRAY: 50 SPRAY, METERED NASAL at 09:10

## 2021-03-12 NOTE — DISCHARGE SUMMARY
Date of Discharge:  3/12/2021    Discharge Diagnosis:Principal Problem:    Schizoaffective disorder (CMS/HCC)  Active Problems:    Intellectual disability    HTN (hypertension)    Acid reflux    High cholesterol        Presenting Problem/History of Present Illness:Patient was admitted to the hospital on March 8 having presented depressed and agitated, voluntarily admitted for safety evaluation and treatment, see admission note for details.         Hospital Course:  Patient was admitted for safety and stabilization and was placed on standard precautions.  Routine labs were checked.  Patient was assigned a masters level therapist and provided with an opportunity to participate in group and individual therapy on the unit.  Patient seen on a daily basis for evaluation and supportive therapy.'s psychotropic medication format evolved to the sertraline 100 mg p.o. twice daily and Abilify 5 mg daily.  Patient status gradually improved and day of discharge patient denying any intent to harm self or others and was free of any psychotic thought content.  Had a phone session with his mother day of discharge discussing his return home to a safe environment.  She states that patient was actually going to relocate to home of his own with case management in the [place] area next week and that he also had a follow-up appointment with CHARLIE Easley at the [facility] primary care office March 17.  Patient will be temporarily staying with his mother till next week.  See below for medications.  Patient was started on metoprolol 25 mg daily during his hospitalization for hypertension.    Consults:   Consults     No orders found from 2/7/2021 to 3/9/2021.          Labs:  Lab Results (all)     Procedure Component Value Units Date/Time    TSH [[id number]]  (Normal) Collected: 03/10/21 1105    Specimen: Blood Updated: 03/10/21 1231     TSH 1.510 uIU/mL     T4, Free [[id number]]  (Normal) Collected: 03/10/21 1105     Specimen: Blood Updated: 03/10/21 1231     Free T4 1.04 ng/dL     Narrative:      Results may be falsely increased if patient taking Biotin.      POC Glucose Once [[id number]]  (Normal) Collected: 03/09/21 2012    Specimen: Blood Updated: 03/09/21 2019     Glucose 95 mg/dL           Imaging:  Imaging Results (All)     None          Condition on Discharge:  improved    Prognosis: Fair    Vital Signs  Temp:  [97.6 °F (36.4 °C)-97.8 °F (36.6 °C)] 97.8 °F (36.6 °C)  Heart Rate:  [74-92] 92  Resp:  [18] 18  BP: (110-164)/() 113/64    Discharge Disposition  Home or Self Care    Discharge Medications     Discharge Medications      New Medications      Instructions Start Date   ARIPiprazole 5 MG tablet  Commonly known as: ABILIFY   5 mg, Oral, Daily   Start Date: March 13, 2021     metoprolol succinate XL 25 MG 24 hr tablet  Commonly known as: TOPROL-XL   25 mg, Oral, Every 24 Hours Scheduled   Start Date: March 13, 2021        Continue These Medications      Instructions Start Date   albuterol sulfate  (90 Base) MCG/ACT inhaler  Commonly known as: PROVENTIL HFA;VENTOLIN HFA;PROAIR HFA   2 puffs, Inhalation, Every 4 Hours PRN      fluticasone 50 MCG/ACT nasal spray  Commonly known as: Flonase   2 sprays, Nasal, Daily      pantoprazole 40 MG EC tablet  Commonly known as: PROTONIX   40 mg, Oral, Daily      sertraline 100 MG tablet  Commonly known as: ZOLOFT   100 mg, Oral, 2 Times Daily      simvastatin 10 MG tablet  Commonly known as: ZOCOR   10 mg, Oral, Nightly      traZODone 150 MG tablet  Commonly known as: DESYREL   150 mg, Oral, Nightly         Stop These Medications    ezetimibe 10 MG tablet  Commonly known as: ZETIA     montelukast 10 MG tablet  Commonly known as: SINGULAIR            Discharge Diet: Regular    Activity at Discharge: No restrictions    Follow-up Appointments: Patient to follow-up at the [facility] primary care office with behavioral health CHARLIE Arreguin March  17.          Nathan Ayala MD  03/12/21  11:02 EST  Time spent with the discharge process >30 minutes.     Dictated utilizing Dragon dictation

## 2021-03-12 NOTE — PLAN OF CARE
Problem: Adult Behavioral Health Plan of Care  Goal: Plan of Care Review  Outcome: Ongoing, Progressing  Flowsheets  Taken 3/12/2021 0449  Progress: improving  Plan of Care Reviewed With: patient  Patient Agreement with Plan of Care: agrees  Outcome Summary:   Alert and oriented   reports good appetite and sleep   rates anxiety 8   depression 10   denies SI/HI/AVH   paranoid   agitated at beginning of shift, stating, “what charges do you have against me”, “I don’t know why I am freaking out”, “I just really want to leave”   PRN medication for anxiety and agitation give IM, pt tolerated well   medications reviewed   encouraged pt to keep mask on while around others, social distance at least 6 ft apart, and wash hands frequently   verbalized understanding   pt resting well throughout the night   fall precautions in place, nonskid socks to BLE   will continue to monitor.  Taken 3/11/2021 1948  Plan of Care Reviewed With: patient  Patient Agreement with Plan of Care: agrees  Goal: Patient-Specific Goal (Individualization)  Outcome: Ongoing, Progressing  Goal: Adheres to Safety Considerations for Self and Others  Outcome: Ongoing, Progressing  Goal: Absence of New-Onset Illness or Injury  Outcome: Ongoing, Progressing  Goal: Optimized Coping Skills in Response to Life Stressors  Outcome: Ongoing, Progressing  Goal: Develops/Participates in Therapeutic [place] to Support Successful Transition  Outcome: Ongoing, Progressing   Goal Outcome Evaluation:  Plan of Care Reviewed With: patient  Progress: improving  Outcome Summary: Alert and oriented; reports good appetite and sleep; rates anxiety 8; depression 10; denies SI/HI/AVH; paranoid; agitated at beginning of shift, stating, “what charges do you have against me”, “I don’t know why I am freaking out”, “I just really want to leave”; PRN medication for anxiety and agitation give IM, pt tolerated well; medications reviewed; encouraged pt to keep mask on while around  others, social distance at least 6 ft apart, and wash hands frequently; verbalized understanding; pt resting well throughout the night; fall precautions in place, nonskid socks to BLE; will continue to monitor.

## 2021-03-12 NOTE — PROGRESS NOTES
Patient was seen and evaluated on March 11, progress note lost somewhere in the process, patient status had been improved being less obviously depressed and voicing some optimism about his returning home.

## 2021-03-22 RX ORDER — SERTRALINE HYDROCHLORIDE 100 MG/1
TABLET, FILM COATED ORAL
Qty: 60 TABLET | Refills: 0 | OUTPATIENT
Start: 2021-03-22

## 2021-04-06 RX ORDER — ARIPIPRAZOLE 5 MG/1
5 TABLET ORAL DAILY
Qty: 30 TABLET | Refills: 0 | Status: SHIPPED | OUTPATIENT
Start: 2021-04-06 | End: 2021-04-14 | Stop reason: SDUPTHER

## 2021-04-06 NOTE — TELEPHONE ENCOUNTER
Pt needs refill on metoprolol, has appt 4/19/21, needs sent to [facility] in [place], he has moved.  Does not have enough to get through the weekend

## 2021-04-06 NOTE — TELEPHONE ENCOUNTER
Pt requests Abilify be sent to [facility] in [place].  Has appt 4/14/21.  Pt has moved.

## 2021-04-08 RX ORDER — METOPROLOL SUCCINATE 25 MG/1
25 TABLET, EXTENDED RELEASE ORAL
Qty: 30 TABLET | Refills: 0 | Status: SHIPPED | OUTPATIENT
Start: 2021-04-08 | End: 2021-04-14 | Stop reason: SDUPTHER

## 2021-04-14 ENCOUNTER — OFFICE VISIT (OUTPATIENT)
Dept: PSYCHIATRY | Facility: CLINIC | Age: 36
End: 2021-04-14

## 2021-04-14 ENCOUNTER — OFFICE VISIT (OUTPATIENT)
Dept: FAMILY MEDICINE CLINIC | Facility: CLINIC | Age: 36
End: 2021-04-14

## 2021-04-14 VITALS
WEIGHT: 239 LBS | HEART RATE: 95 BPM | BODY MASS INDEX: 33.46 KG/M2 | SYSTOLIC BLOOD PRESSURE: 120 MMHG | DIASTOLIC BLOOD PRESSURE: 90 MMHG | HEIGHT: 71 IN | TEMPERATURE: 98.9 F

## 2021-04-14 VITALS
BODY MASS INDEX: 33.46 KG/M2 | HEIGHT: 71 IN | SYSTOLIC BLOOD PRESSURE: 120 MMHG | HEART RATE: 95 BPM | WEIGHT: 239 LBS | OXYGEN SATURATION: 98 % | DIASTOLIC BLOOD PRESSURE: 90 MMHG | TEMPERATURE: 98.9 F | RESPIRATION RATE: 16 BRPM

## 2021-04-14 DIAGNOSIS — J30.2 SEASONAL ALLERGIES: Chronic | ICD-10-CM

## 2021-04-14 DIAGNOSIS — I10 ESSENTIAL HYPERTENSION: Chronic | ICD-10-CM

## 2021-04-14 DIAGNOSIS — J45.990 EXERCISE-INDUCED ASTHMA: Chronic | ICD-10-CM

## 2021-04-14 DIAGNOSIS — F79 INTELLECTUAL DISABILITY: Primary | ICD-10-CM

## 2021-04-14 DIAGNOSIS — E78.2 MIXED HYPERLIPIDEMIA: Chronic | ICD-10-CM

## 2021-04-14 DIAGNOSIS — Z00.00 MEDICARE ANNUAL WELLNESS VISIT, SUBSEQUENT: Primary | ICD-10-CM

## 2021-04-14 DIAGNOSIS — F39 UNSPECIFIED MOOD (AFFECTIVE) DISORDER (HCC): ICD-10-CM

## 2021-04-14 DIAGNOSIS — F41.1 GENERALIZED ANXIETY DISORDER: ICD-10-CM

## 2021-04-14 DIAGNOSIS — K21.9 GASTROESOPHAGEAL REFLUX DISEASE, UNSPECIFIED WHETHER ESOPHAGITIS PRESENT: Chronic | ICD-10-CM

## 2021-04-14 DIAGNOSIS — F51.05 INSOMNIA DUE TO MENTAL CONDITION: ICD-10-CM

## 2021-04-14 PROCEDURE — 85025 COMPLETE CBC W/AUTO DIFF WBC: CPT | Performed by: NURSE PRACTITIONER

## 2021-04-14 PROCEDURE — 80061 LIPID PANEL: CPT | Performed by: NURSE PRACTITIONER

## 2021-04-14 PROCEDURE — 99214 OFFICE O/P EST MOD 30 MIN: CPT | Performed by: NURSE PRACTITIONER

## 2021-04-14 PROCEDURE — 36415 COLL VENOUS BLD VENIPUNCTURE: CPT | Performed by: NURSE PRACTITIONER

## 2021-04-14 PROCEDURE — 80053 COMPREHEN METABOLIC PANEL: CPT | Performed by: NURSE PRACTITIONER

## 2021-04-14 RX ORDER — EZETIMIBE 10 MG/1
10 TABLET ORAL DAILY
Qty: 30 TABLET | Refills: 5 | Status: SHIPPED | OUTPATIENT
Start: 2021-04-14 | End: 2021-11-12 | Stop reason: SDUPTHER

## 2021-04-14 RX ORDER — ARIPIPRAZOLE 10 MG/1
TABLET ORAL
Qty: 30 TABLET | Refills: 2 | Status: SHIPPED | OUTPATIENT
Start: 2021-04-14 | End: 2021-08-23 | Stop reason: SDUPTHER

## 2021-04-14 RX ORDER — METOPROLOL SUCCINATE 25 MG/1
25 TABLET, EXTENDED RELEASE ORAL
Qty: 30 TABLET | Refills: 5 | Status: SHIPPED | OUTPATIENT
Start: 2021-04-14 | End: 2021-11-12 | Stop reason: SDUPTHER

## 2021-04-14 RX ORDER — SIMVASTATIN 10 MG
10 TABLET ORAL NIGHTLY
Qty: 30 TABLET | Refills: 5 | Status: SHIPPED | OUTPATIENT
Start: 2021-04-14 | End: 2021-11-12 | Stop reason: SDUPTHER

## 2021-04-14 RX ORDER — PANTOPRAZOLE SODIUM 40 MG/1
40 TABLET, DELAYED RELEASE ORAL DAILY
Qty: 30 TABLET | Refills: 5 | Status: SHIPPED | OUTPATIENT
Start: 2021-04-14 | End: 2021-11-12 | Stop reason: SDUPTHER

## 2021-04-14 RX ORDER — ALBUTEROL SULFATE 90 UG/1
2 AEROSOL, METERED RESPIRATORY (INHALATION) EVERY 4 HOURS PRN
Qty: 18 G | Refills: 5 | Status: SHIPPED | OUTPATIENT
Start: 2021-04-14 | End: 2021-11-12 | Stop reason: SDUPTHER

## 2021-04-14 RX ORDER — SERTRALINE HYDROCHLORIDE 100 MG/1
100 TABLET, FILM COATED ORAL 2 TIMES DAILY
Qty: 60 TABLET | Refills: 2 | Status: SHIPPED | OUTPATIENT
Start: 2021-04-14 | End: 2021-08-23 | Stop reason: SDUPTHER

## 2021-04-14 RX ORDER — FLUTICASONE PROPIONATE 50 MCG
2 SPRAY, SUSPENSION (ML) NASAL DAILY
Qty: 18.2 ML | Refills: 5 | Status: SHIPPED | OUTPATIENT
Start: 2021-04-14 | End: 2021-11-12 | Stop reason: SDUPTHER

## 2021-04-14 RX ORDER — TRAZODONE HYDROCHLORIDE 150 MG/1
150 TABLET ORAL NIGHTLY
Qty: 30 TABLET | Refills: 2 | Status: SHIPPED | OUTPATIENT
Start: 2021-04-14 | End: 2021-08-23 | Stop reason: SDUPTHER

## 2021-04-14 NOTE — PROGRESS NOTES
"Subjective   Nelson Callahan is a 36 y.o. male.     Chief Complaint   Patient presents with   • Medicare Wellness-subsequent       History of Present Illness     The following portions of the patient's history were reviewed and updated as appropriate: allergies, current medications, past family history, past medical history, past social history, past surgical history and problem list.    Review of Systems    Objective     There were no vitals taken for this visit.    Physical Exam    Assessment/Plan     Problem List Items Addressed This Visit     None          {PHQ-9 Score:90031:::1}    Patient's There is no height or weight on file to calculate BMI. BMI is {BMI range:84769}.   (Normal BMI:  18.5-24.9, OW 25-29.9, Obesity 30 or greater)    Nelson Callahan  reports that he has been smoking cigarettes. He has a 7.00 pack-year smoking history. He has never used smokeless tobacco.. I have educated him on the risk of diseases from using tobacco products such as {Tobacco Cessation Diseases:38403::\"cancer\",\"COPD\",\"heart disease\"}.     I advised him to quit and he is {Willing/Not Willing to Quit Tobacco Products:48291}.    I spent {Time Spent Tobacco :72386} minutes counseling the patient.           Understands disease processes and need for medications.  Understands reasons for urgent and emergent care.  Patient (& family) verbalized agreement for treatment plan.   Emotional support and active listening provided.  Patient provided time to verbalize feelings.               This document has been electronically signed by CHARLIE Salinas   April 14, 2021 13:23 EDT  "

## 2021-04-14 NOTE — PROGRESS NOTES
Subjective   Nelson Callahan is a 36 y.o. male is being interviewed today via telephone as recommended per CDC guidelines associated with Corona Pandemic.    Chief Complaint: MDD, anxiety, ID    History of Present Illness  He states that he is currently living in [place] in a trailer by himself with support staff available to him for up to 16 hours daily.  He states that he was started back on Abilify while in the hospital and he feels like he would benefit from a slightly higher dose of Abilify, he states that he is doing everything he is suppose to do. He states that he continues to take the zoloft with no SE or problems.  He states that he feels like he needs to be back on the trazodone.  He rates his mood about 3-4/10 with 10 being the worse.  He states that he hates getting out in public, rates his anxiety about 2/10 with 10 being the worse.  He states that he has been taking the trazodone with periods of waking up throughout the night, he is getting between 3-4 hours of sleep at a time, denies any NM.  Appetite has been good with slight weight increase.  Recommended that he try to implement an exercise event into his daily scheduled and eat healthy.  He recently started HTN medication while admitted to the hospital.  Denies any new stressors. He is not interested in the COVID vaccine.  Denies any AV hallucinations, denies any SI/HI.      The following portions of the patient's history were reviewed and updated as appropriate: allergies, current medications, past family history, past medical history, past social history, past surgical history and problem list.    Review of Systems   Constitutional: Negative for appetite change, chills, diaphoresis, fatigue, fever and unexpected weight change.   HENT: Negative for hearing loss, sore throat, trouble swallowing and voice change.    Eyes: Negative for photophobia and visual disturbance.   Respiratory: Negative for cough, chest tightness and shortness of breath.   "  Cardiovascular: Negative for chest pain and palpitations.   Gastrointestinal: Negative for abdominal pain, constipation, nausea and vomiting.   Endocrine: Negative for cold intolerance and heat intolerance.   Genitourinary: Negative for dysuria and frequency.   Musculoskeletal: Negative for arthralgias, back pain, joint swelling and neck stiffness.   Skin: Negative for color change and wound.   Allergic/Immunologic: Negative for environmental allergies and immunocompromised state.   Neurological: Negative for dizziness, tremors, seizures, syncope, weakness, light-headedness and headaches.   Hematological: Negative for adenopathy. Does not bruise/bleed easily.       Objective    Physical Exam  Vitals reviewed.   Constitutional:       General: He is not in acute distress.     Appearance: He is well-developed.   Neurological:      Mental Status: He is alert.      Coordination: Coordination normal.      Gait: Gait normal.       Blood pressure 120/90, pulse 95, temperature 98.9 °F (37.2 °C), temperature source Temporal, height 180.3 cm (70.98\"), weight 108 kg (239 lb). Body mass index is 33.35 kg/m².    Medication List:   Current Outpatient Medications   Medication Sig Dispense Refill   • albuterol sulfate  (90 Base) MCG/ACT inhaler Inhale 2 puffs Every 4 (Four) Hours As Needed for Wheezing. Indications: Spasm of Lung Air Passages 18 g 5   • ARIPiprazole (ABILIFY) 10 MG tablet Take 1/2 tablet by mouth twice daily  Indications: Major Depressive Disorder 30 tablet 2   • ezetimibe (ZETIA) 10 MG tablet Take 1 tablet by mouth Daily. Indications: High Amount of Fats in the Blood 30 tablet 5   • fluticasone (Flonase) 50 MCG/ACT nasal spray 2 sprays into the nostril(s) as directed by provider Daily. Indications: Signs and Symptoms of Nose Diseases 18.2 mL 5   • metoprolol succinate XL (TOPROL-XL) 25 MG 24 hr tablet Take 1 tablet by mouth Daily. Indications: High Blood Pressure Disorder 30 tablet 5   • pantoprazole " (PROTONIX) 40 MG EC tablet Take 1 tablet by mouth Daily. Indications: Gastroesophageal Reflux Disease 30 tablet 5   • sertraline (ZOLOFT) 100 MG tablet Take 1 tablet by mouth 2 (Two) Times a Day. Indications: Major Depressive Disorder 60 tablet 2   • simvastatin (ZOCOR) 10 MG tablet Take 1 tablet by mouth Every Night. Indications: High Amount of Fats in the Blood 30 tablet 5   • traZODone (DESYREL) 150 MG tablet Take 1 tablet by mouth Every Night. Indications: Trouble Sleeping 30 tablet 2     No current facility-administered medications for this visit.       Mental Status Exam:   Hygiene:   fair  Cooperation:  Cooperative  Eye Contact:  Fair  Psychomotor Behavior:  Appropriate  Affect:  Appropriate  Hopelessness: Denies  Speech:  Minimal  Thought Process:  Linear  Thought Content:  Mood congruent  Suicidal:  None  Homicidal:  None  Hallucinations:  None  Delusion:  None  Memory:  Intact  Orientation:  Person, Place, Time and Situation  Reliability:  fair  Insight:  Fair  Judgement:  Fair  Impulse Control:  Fair  Physical/Medical Issues:  No     Assessment/Plan   Problems Addressed this Visit        Neuro    Intellectual disability - Primary    Relevant Medications    ARIPiprazole (ABILIFY) 10 MG tablet    sertraline (ZOLOFT) 100 MG tablet    traZODone (DESYREL) 150 MG tablet      Other Visit Diagnoses     Insomnia due to mental condition        Relevant Medications    ARIPiprazole (ABILIFY) 10 MG tablet    sertraline (ZOLOFT) 100 MG tablet    traZODone (DESYREL) 150 MG tablet    Unspecified mood (affective) disorder (CMS/HCC)        Relevant Medications    ARIPiprazole (ABILIFY) 10 MG tablet    sertraline (ZOLOFT) 100 MG tablet    traZODone (DESYREL) 150 MG tablet    Generalized anxiety disorder        Relevant Medications    ARIPiprazole (ABILIFY) 10 MG tablet    sertraline (ZOLOFT) 100 MG tablet    traZODone (DESYREL) 150 MG tablet      Diagnoses       Codes Comments    Intellectual disability    -  Primary  ICD-10-CM: F79  ICD-9-CM: 319     Insomnia due to mental condition     ICD-10-CM: F51.05  ICD-9-CM: 300.9, 327.02     Unspecified mood (affective) disorder (CMS/HCC)     ICD-10-CM: F39  ICD-9-CM: 296.90     Generalized anxiety disorder     ICD-10-CM: F41.1  ICD-9-CM: 300.02         Discussed medication options. Continue and increase abilify for mood, zoloft for depression and anxiety, and trazodone for sleep.   Reviewed the risks, benefits, and side effects of the medications; patient acknowledged and verbally consented.  Patient is agreeable to call the [facility] with any worsening of symptoms.   Patient is aware to call 911 or go to the nearest ER should begin having SI/HI.     Prognosis: Guarded dependent on medication, follow up appointment and treatment plan compliance     Functionality:  Fair.  Symptoms have improved with periodic episodes of anxiety when interacting with the public.    SDMT reviewed with [facility]IR[facility]-7 indicated mild anxiety.    Return in 12 weeks.

## 2021-04-14 NOTE — PATIENT INSTRUCTIONS

## 2021-04-15 LAB
ALBUMIN SERPL-MCNC: 4.7 G/DL (ref 3.5–5.2)
ALBUMIN/GLOB SERPL: 1.9 G/DL
ALP SERPL-CCNC: 57 U/L (ref 39–117)
ALT SERPL W P-5'-P-CCNC: 36 U/L (ref 1–41)
ANION GAP SERPL CALCULATED.3IONS-SCNC: 11.2 MMOL/L (ref 5–15)
AST SERPL-CCNC: 31 U/L (ref 1–40)
BASOPHILS # BLD AUTO: 0.03 10*3/MM3 (ref 0–0.2)
BASOPHILS NFR BLD AUTO: 0.5 % (ref 0–1.5)
BILIRUB SERPL-MCNC: 0.6 MG/DL (ref 0–1.2)
BUN SERPL-MCNC: 8 MG/DL (ref 6–20)
BUN/CREAT SERPL: 10.1 (ref 7–25)
CALCIUM SPEC-SCNC: 9.4 MG/DL (ref 8.6–10.5)
CHLORIDE SERPL-SCNC: 102 MMOL/L (ref 98–107)
CHOLEST SERPL-MCNC: 123 MG/DL (ref 0–200)
CO2 SERPL-SCNC: 24.8 MMOL/L (ref 22–29)
CREAT SERPL-MCNC: 0.79 MG/DL (ref 0.76–1.27)
DEPRECATED RDW RBC AUTO: 45.5 FL (ref 37–54)
EOSINOPHIL # BLD AUTO: 0.04 10*3/MM3 (ref 0–0.4)
EOSINOPHIL NFR BLD AUTO: 0.6 % (ref 0.3–6.2)
ERYTHROCYTE [DISTWIDTH] IN BLOOD BY AUTOMATED COUNT: 12.8 % (ref 12.3–15.4)
GFR SERPL CREATININE-BSD FRML MDRD: 112 ML/MIN/1.73
GLOBULIN UR ELPH-MCNC: 2.5 GM/DL
GLUCOSE SERPL-MCNC: 91 MG/DL (ref 65–99)
HCT VFR BLD AUTO: 49 % (ref 37.5–51)
HDLC SERPL-MCNC: 47 MG/DL (ref 40–60)
HGB BLD-MCNC: 16.9 G/DL (ref 13–17.7)
IMM GRANULOCYTES # BLD AUTO: 0.03 10*3/MM3 (ref 0–0.05)
IMM GRANULOCYTES NFR BLD AUTO: 0.5 % (ref 0–0.5)
LDLC SERPL CALC-MCNC: 63 MG/DL (ref 0–100)
LDLC/HDLC SERPL: 1.35 {RATIO}
LYMPHOCYTES # BLD AUTO: 1.3 10*3/MM3 (ref 0.7–3.1)
LYMPHOCYTES NFR BLD AUTO: 19.6 % (ref 19.6–45.3)
MCH RBC QN AUTO: 33.7 PG (ref 26.6–33)
MCHC RBC AUTO-ENTMCNC: 34.5 G/DL (ref 31.5–35.7)
MCV RBC AUTO: 97.8 FL (ref 79–97)
MONOCYTES # BLD AUTO: 0.53 10*3/MM3 (ref 0.1–0.9)
MONOCYTES NFR BLD AUTO: 8 % (ref 5–12)
NEUTROPHILS NFR BLD AUTO: 4.7 10*3/MM3 (ref 1.7–7)
NEUTROPHILS NFR BLD AUTO: 70.8 % (ref 42.7–76)
NRBC BLD AUTO-RTO: 0 /100 WBC (ref 0–0.2)
PLATELET # BLD AUTO: 224 10*3/MM3 (ref 140–450)
PMV BLD AUTO: 11.3 FL (ref 6–12)
POTASSIUM SERPL-SCNC: 4.4 MMOL/L (ref 3.5–5.2)
PROT SERPL-MCNC: 7.2 G/DL (ref 6–8.5)
RBC # BLD AUTO: 5.01 10*6/MM3 (ref 4.14–5.8)
SODIUM SERPL-SCNC: 138 MMOL/L (ref 136–145)
TRIGL SERPL-MCNC: 63 MG/DL (ref 0–150)
VLDLC SERPL-MCNC: 13 MG/DL (ref 5–40)
WBC # BLD AUTO: 6.63 10*3/MM3 (ref 3.4–10.8)

## 2021-08-24 RX ORDER — ARIPIPRAZOLE 10 MG/1
TABLET ORAL
Qty: 30 TABLET | Refills: 1 | Status: SHIPPED | OUTPATIENT
Start: 2021-08-24

## 2021-08-24 RX ORDER — SERTRALINE HYDROCHLORIDE 100 MG/1
100 TABLET, FILM COATED ORAL 2 TIMES DAILY
Qty: 60 TABLET | Refills: 1 | Status: SHIPPED | OUTPATIENT
Start: 2021-08-24

## 2021-08-24 RX ORDER — TRAZODONE HYDROCHLORIDE 150 MG/1
150 TABLET ORAL NIGHTLY
Qty: 30 TABLET | Refills: 1 | Status: SHIPPED | OUTPATIENT
Start: 2021-08-24

## 2021-11-12 DIAGNOSIS — I10 ESSENTIAL HYPERTENSION: Chronic | ICD-10-CM

## 2021-11-12 DIAGNOSIS — J45.990 EXERCISE-INDUCED ASTHMA: Chronic | ICD-10-CM

## 2021-11-12 DIAGNOSIS — K21.9 GASTROESOPHAGEAL REFLUX DISEASE, UNSPECIFIED WHETHER ESOPHAGITIS PRESENT: Chronic | ICD-10-CM

## 2021-11-12 DIAGNOSIS — E78.2 MIXED HYPERLIPIDEMIA: Chronic | ICD-10-CM

## 2021-11-12 DIAGNOSIS — J30.2 SEASONAL ALLERGIES: Chronic | ICD-10-CM

## 2021-11-12 RX ORDER — EZETIMIBE 10 MG/1
10 TABLET ORAL DAILY
Qty: 30 TABLET | Refills: 0 | Status: SHIPPED | OUTPATIENT
Start: 2021-11-12 | End: 2022-05-25 | Stop reason: SDUPTHER

## 2021-11-12 RX ORDER — FLUTICASONE PROPIONATE 50 MCG
2 SPRAY, SUSPENSION (ML) NASAL DAILY
Qty: 18.2 ML | Refills: 0 | Status: SHIPPED | OUTPATIENT
Start: 2021-11-12 | End: 2022-05-25 | Stop reason: SDUPTHER

## 2021-11-12 RX ORDER — METOPROLOL SUCCINATE 25 MG/1
TABLET, EXTENDED RELEASE ORAL
Qty: 30 TABLET | Refills: 5 | OUTPATIENT
Start: 2021-11-12

## 2021-11-12 RX ORDER — ALBUTEROL SULFATE 90 UG/1
2 AEROSOL, METERED RESPIRATORY (INHALATION) EVERY 4 HOURS PRN
Qty: 18 G | Refills: 0 | Status: SHIPPED | OUTPATIENT
Start: 2021-11-12 | End: 2022-05-25 | Stop reason: SDUPTHER

## 2021-11-12 RX ORDER — EZETIMIBE 10 MG/1
TABLET ORAL
Qty: 30 TABLET | Refills: 5 | OUTPATIENT
Start: 2021-11-12

## 2021-11-12 RX ORDER — METOPROLOL SUCCINATE 25 MG/1
25 TABLET, EXTENDED RELEASE ORAL
Qty: 30 TABLET | Refills: 0 | Status: SHIPPED | OUTPATIENT
Start: 2021-11-12 | End: 2022-05-25

## 2021-11-12 RX ORDER — PANTOPRAZOLE SODIUM 40 MG/1
TABLET, DELAYED RELEASE ORAL
Qty: 30 TABLET | Refills: 5 | OUTPATIENT
Start: 2021-11-12

## 2021-11-12 RX ORDER — SIMVASTATIN 10 MG
10 TABLET ORAL NIGHTLY
Qty: 30 TABLET | Refills: 0 | Status: SHIPPED | OUTPATIENT
Start: 2021-11-12 | End: 2022-05-25 | Stop reason: SDUPTHER

## 2021-11-12 RX ORDER — PANTOPRAZOLE SODIUM 40 MG/1
40 TABLET, DELAYED RELEASE ORAL DAILY
Qty: 30 TABLET | Refills: 0 | Status: SHIPPED | OUTPATIENT
Start: 2021-11-12 | End: 2022-05-25 | Stop reason: SDUPTHER

## 2021-11-12 RX ORDER — FLUTICASONE PROPIONATE 50 MCG
SPRAY, SUSPENSION (ML) NASAL
Qty: 16 G | Refills: 5 | OUTPATIENT
Start: 2021-11-12

## 2021-11-12 RX ORDER — SIMVASTATIN 10 MG
TABLET ORAL
Qty: 30 TABLET | Refills: 5 | OUTPATIENT
Start: 2021-11-12

## 2022-05-02 DIAGNOSIS — J45.990 EXERCISE-INDUCED ASTHMA: Chronic | ICD-10-CM

## 2022-05-02 RX ORDER — ALBUTEROL SULFATE 90 UG/1
AEROSOL, METERED RESPIRATORY (INHALATION)
Qty: 8.5 G | Refills: 5 | OUTPATIENT
Start: 2022-05-02

## 2022-05-25 ENCOUNTER — OFFICE VISIT (OUTPATIENT)
Dept: FAMILY MEDICINE CLINIC | Facility: CLINIC | Age: 37
End: 2022-05-25

## 2022-05-25 VITALS
WEIGHT: 259 LBS | HEART RATE: 84 BPM | HEIGHT: 71 IN | DIASTOLIC BLOOD PRESSURE: 81 MMHG | OXYGEN SATURATION: 96 % | RESPIRATION RATE: 18 BRPM | TEMPERATURE: 98.6 F | SYSTOLIC BLOOD PRESSURE: 141 MMHG | BODY MASS INDEX: 36.26 KG/M2

## 2022-05-25 DIAGNOSIS — M54.50 CHRONIC LOW BACK PAIN, UNSPECIFIED BACK PAIN LATERALITY, UNSPECIFIED WHETHER SCIATICA PRESENT: ICD-10-CM

## 2022-05-25 DIAGNOSIS — E78.2 MIXED HYPERLIPIDEMIA: Chronic | ICD-10-CM

## 2022-05-25 DIAGNOSIS — J30.2 SEASONAL ALLERGIES: Chronic | ICD-10-CM

## 2022-05-25 DIAGNOSIS — J45.990 EXERCISE-INDUCED ASTHMA: Chronic | ICD-10-CM

## 2022-05-25 DIAGNOSIS — Z13.29 SCREENING FOR THYROID DISORDER: ICD-10-CM

## 2022-05-25 DIAGNOSIS — I10 ESSENTIAL HYPERTENSION: ICD-10-CM

## 2022-05-25 DIAGNOSIS — G89.29 CHRONIC LOW BACK PAIN, UNSPECIFIED BACK PAIN LATERALITY, UNSPECIFIED WHETHER SCIATICA PRESENT: ICD-10-CM

## 2022-05-25 DIAGNOSIS — K21.9 GASTROESOPHAGEAL REFLUX DISEASE, UNSPECIFIED WHETHER ESOPHAGITIS PRESENT: Chronic | ICD-10-CM

## 2022-05-25 DIAGNOSIS — I10 ESSENTIAL HYPERTENSION: Primary | Chronic | ICD-10-CM

## 2022-05-25 LAB
ALBUMIN SERPL-MCNC: 4.9 G/DL (ref 3.5–5.2)
ALBUMIN/GLOB SERPL: 1.9 G/DL
ALP SERPL-CCNC: 58 U/L (ref 39–117)
ALT SERPL W P-5'-P-CCNC: 34 U/L (ref 1–41)
ANION GAP SERPL CALCULATED.3IONS-SCNC: 12.7 MMOL/L (ref 5–15)
AST SERPL-CCNC: 29 U/L (ref 1–40)
BASOPHILS # BLD AUTO: 0.03 10*3/MM3 (ref 0–0.2)
BASOPHILS NFR BLD AUTO: 0.4 % (ref 0–1.5)
BILIRUB SERPL-MCNC: 0.3 MG/DL (ref 0–1.2)
BUN SERPL-MCNC: 14 MG/DL (ref 6–20)
BUN/CREAT SERPL: 15.7 (ref 7–25)
CALCIUM SPEC-SCNC: 9.5 MG/DL (ref 8.6–10.5)
CHLORIDE SERPL-SCNC: 102 MMOL/L (ref 98–107)
CHOLEST SERPL-MCNC: 149 MG/DL (ref 0–200)
CO2 SERPL-SCNC: 23.3 MMOL/L (ref 22–29)
CREAT SERPL-MCNC: 0.89 MG/DL (ref 0.76–1.27)
DEPRECATED RDW RBC AUTO: 45.2 FL (ref 37–54)
EGFRCR SERPLBLD CKD-EPI 2021: 114.6 ML/MIN/1.73
EOSINOPHIL # BLD AUTO: 0.06 10*3/MM3 (ref 0–0.4)
EOSINOPHIL NFR BLD AUTO: 0.9 % (ref 0.3–6.2)
ERYTHROCYTE [DISTWIDTH] IN BLOOD BY AUTOMATED COUNT: 12.6 % (ref 12.3–15.4)
GLOBULIN UR ELPH-MCNC: 2.6 GM/DL
GLUCOSE SERPL-MCNC: 93 MG/DL (ref 65–99)
HCT VFR BLD AUTO: 47.9 % (ref 37.5–51)
HDLC SERPL-MCNC: 37 MG/DL (ref 40–60)
HGB BLD-MCNC: 15.9 G/DL (ref 13–17.7)
IMM GRANULOCYTES # BLD AUTO: 0.01 10*3/MM3 (ref 0–0.05)
IMM GRANULOCYTES NFR BLD AUTO: 0.1 % (ref 0–0.5)
LDLC SERPL CALC-MCNC: 89 MG/DL (ref 0–100)
LDLC/HDLC SERPL: 2.35 {RATIO}
LYMPHOCYTES # BLD AUTO: 1.71 10*3/MM3 (ref 0.7–3.1)
LYMPHOCYTES NFR BLD AUTO: 24.5 % (ref 19.6–45.3)
MCH RBC QN AUTO: 32.3 PG (ref 26.6–33)
MCHC RBC AUTO-ENTMCNC: 33.2 G/DL (ref 31.5–35.7)
MCV RBC AUTO: 97.4 FL (ref 79–97)
MONOCYTES # BLD AUTO: 0.57 10*3/MM3 (ref 0.1–0.9)
MONOCYTES NFR BLD AUTO: 8.2 % (ref 5–12)
NEUTROPHILS NFR BLD AUTO: 4.59 10*3/MM3 (ref 1.7–7)
NEUTROPHILS NFR BLD AUTO: 65.9 % (ref 42.7–76)
NRBC BLD AUTO-RTO: 0 /100 WBC (ref 0–0.2)
PLATELET # BLD AUTO: 229 10*3/MM3 (ref 140–450)
PMV BLD AUTO: 10.6 FL (ref 6–12)
POTASSIUM SERPL-SCNC: 4.5 MMOL/L (ref 3.5–5.2)
PROT SERPL-MCNC: 7.5 G/DL (ref 6–8.5)
RBC # BLD AUTO: 4.92 10*6/MM3 (ref 4.14–5.8)
SODIUM SERPL-SCNC: 138 MMOL/L (ref 136–145)
TRIGL SERPL-MCNC: 126 MG/DL (ref 0–150)
TSH SERPL DL<=0.05 MIU/L-ACNC: 1.13 UIU/ML (ref 0.27–4.2)
VLDLC SERPL-MCNC: 23 MG/DL (ref 5–40)
WBC NRBC COR # BLD: 6.97 10*3/MM3 (ref 3.4–10.8)

## 2022-05-25 PROCEDURE — 99214 OFFICE O/P EST MOD 30 MIN: CPT | Performed by: NURSE PRACTITIONER

## 2022-05-25 PROCEDURE — 84443 ASSAY THYROID STIM HORMONE: CPT | Performed by: NURSE PRACTITIONER

## 2022-05-25 PROCEDURE — 80053 COMPREHEN METABOLIC PANEL: CPT | Performed by: NURSE PRACTITIONER

## 2022-05-25 PROCEDURE — 36415 COLL VENOUS BLD VENIPUNCTURE: CPT | Performed by: NURSE PRACTITIONER

## 2022-05-25 PROCEDURE — 80061 LIPID PANEL: CPT | Performed by: NURSE PRACTITIONER

## 2022-05-25 PROCEDURE — 85025 COMPLETE CBC W/AUTO DIFF WBC: CPT | Performed by: NURSE PRACTITIONER

## 2022-05-25 RX ORDER — LORATADINE 10 MG/1
10 TABLET ORAL DAILY
Qty: 30 TABLET | Refills: 5 | Status: SHIPPED | OUTPATIENT
Start: 2022-05-25

## 2022-05-25 RX ORDER — LAMOTRIGINE 25 MG/1
TABLET ORAL
COMMUNITY
Start: 2022-05-05

## 2022-05-25 RX ORDER — EZETIMIBE 10 MG/1
10 TABLET ORAL DAILY
Qty: 30 TABLET | Refills: 5 | Status: SHIPPED | OUTPATIENT
Start: 2022-05-25

## 2022-05-25 RX ORDER — FLUTICASONE PROPIONATE 50 MCG
2 SPRAY, SUSPENSION (ML) NASAL DAILY
Qty: 18.2 ML | Refills: 5 | Status: SHIPPED | OUTPATIENT
Start: 2022-05-25

## 2022-05-25 RX ORDER — CYCLOBENZAPRINE HCL 5 MG
5 TABLET ORAL NIGHTLY PRN
Qty: 30 TABLET | Refills: 1 | Status: SHIPPED | OUTPATIENT
Start: 2022-05-25

## 2022-05-25 RX ORDER — METOPROLOL SUCCINATE 50 MG/1
50 TABLET, EXTENDED RELEASE ORAL DAILY
Qty: 30 TABLET | Refills: 1 | Status: SHIPPED | OUTPATIENT
Start: 2022-05-25 | End: 2022-08-03

## 2022-05-25 RX ORDER — IBUPROFEN 600 MG/1
600 TABLET ORAL EVERY 6 HOURS PRN
Qty: 30 TABLET | Refills: 45 | Status: SHIPPED | OUTPATIENT
Start: 2022-05-25

## 2022-05-25 RX ORDER — LURASIDONE HYDROCHLORIDE 80 MG/1
TABLET, FILM COATED ORAL
COMMUNITY
Start: 2022-04-30

## 2022-05-25 RX ORDER — METOPROLOL SUCCINATE 50 MG/1
50 TABLET, EXTENDED RELEASE ORAL DAILY
Qty: 90 TABLET | OUTPATIENT
Start: 2022-05-25

## 2022-05-25 RX ORDER — NALTREXONE 380 MG
KIT INTRAMUSCULAR
COMMUNITY
Start: 2022-04-29

## 2022-05-25 RX ORDER — ALBUTEROL SULFATE 0.63 MG/3ML
1 SOLUTION RESPIRATORY (INHALATION) EVERY 6 HOURS PRN
Qty: 120 EACH | Refills: 0 | Status: SHIPPED | OUTPATIENT
Start: 2022-05-25

## 2022-05-25 RX ORDER — ALBUTEROL SULFATE 90 UG/1
2 AEROSOL, METERED RESPIRATORY (INHALATION) EVERY 4 HOURS PRN
Qty: 18 G | Refills: 5 | Status: SHIPPED | OUTPATIENT
Start: 2022-05-25

## 2022-05-25 RX ORDER — SIMVASTATIN 10 MG
10 TABLET ORAL NIGHTLY
Qty: 30 TABLET | Refills: 5 | Status: SHIPPED | OUTPATIENT
Start: 2022-05-25

## 2022-05-25 RX ORDER — PANTOPRAZOLE SODIUM 40 MG/1
40 TABLET, DELAYED RELEASE ORAL DAILY
Qty: 30 TABLET | Refills: 5 | Status: SHIPPED | OUTPATIENT
Start: 2022-05-25

## 2022-05-25 NOTE — PROGRESS NOTES
Venipuncture Blood Specimen Collection  Venipuncture performed in right arm by Ruby Baird MA with good hemostasis. Patient tolerated the procedure well without complications.   05/25/22   Ruby Baird MA

## 2022-05-25 NOTE — PROGRESS NOTES
Subjective   Nelson Callahan is a 37 y.o. male.     Chief Complaint   Patient presents with   • Hypertension       He presents for follow up of essential hypertension, mixed hyperlipidemia, and asthma. He states his blood pressure has been running higher than usual. He reports good compliance with medications. He states he has cut back on coffee and smoking. He states his breathing is good. He uses his inhaler sometimes when he has coughing fits. He c/o back pain. He states he has treated it with ibuprofen in the past. He states he has a h/o alcoholism and substance abuse so he does not want narcotics. He used flexeril in the past for his back pain. He states he swallowed 4 bottles of tylenol and washed it down with vodka because he was in a bad state. He states he thanks God every day that he didn't damage his liver. He denies suicidal and homicidal ideations.        The following portions of the patient's history were reviewed and updated as appropriate: allergies, current medications, past family history, past medical history, past social history, past surgical history and problem list.    Review of Systems   Constitutional: Negative for fever and unexpected weight change.   HENT: Negative for ear pain, rhinorrhea, sore throat and trouble swallowing.    Eyes: Negative for visual disturbance.   Respiratory: Negative for cough, shortness of breath and wheezing.    Cardiovascular: Negative for chest pain and palpitations.   Gastrointestinal: Negative for abdominal pain, blood in stool, constipation, diarrhea, nausea and vomiting.   Genitourinary: Negative for dysuria.   Musculoskeletal: Negative for arthralgias and myalgias.   Skin: Negative for color change.   Allergic/Immunologic: Positive for environmental allergies.   Neurological: Negative for dizziness.   Hematological: Negative for adenopathy.   Psychiatric/Behavioral: Negative for sleep disturbance and suicidal ideas. The patient is not nervous/anxious.   "      Objective     /81   Pulse 84   Temp 98.6 °F (37 °C) (Temporal)   Resp 18   Ht 180.3 cm (70.98\")   Wt 117 kg (259 lb)   SpO2 96%   BMI 36.14 kg/m²     Physical Exam  Vitals reviewed.   Constitutional:       General: He is not in acute distress.     Appearance: He is well-developed. He is not diaphoretic.   HENT:      Head: Normocephalic and atraumatic.   Cardiovascular:      Rate and Rhythm: Normal rate and regular rhythm.      Heart sounds: Normal heart sounds. No murmur heard.    No friction rub. No gallop.   Pulmonary:      Effort: Pulmonary effort is normal. No respiratory distress.      Breath sounds: Normal breath sounds.   Abdominal:      General: Bowel sounds are normal. There is no distension.      Palpations: Abdomen is soft.      Tenderness: There is no abdominal tenderness.   Skin:     General: Skin is warm and dry.   Neurological:      Mental Status: He is alert and oriented to person, place, and time.   Psychiatric:         Behavior: Behavior normal.         Thought Content: Thought content normal.         Judgment: Judgment normal.         Current Outpatient Medications   Medication Sig Dispense Refill   • albuterol sulfate  (90 Base) MCG/ACT inhaler Inhale 2 puffs Every 4 (Four) Hours As Needed for Wheezing. Indications: Spasm of Lung Air Passages 18 g 5   • ARIPiprazole (ABILIFY) 10 MG tablet Take 1/2 tablet by mouth twice daily  Indications: Major Depressive Disorder 30 tablet 1   • ezetimibe (ZETIA) 10 MG tablet Take 1 tablet by mouth Daily. Indications: High Amount of Fats in the Blood 30 tablet 5   • fluticasone (Flonase) 50 MCG/ACT nasal spray 2 sprays into the nostril(s) as directed by provider Daily. Indications: Signs and Symptoms of Nose Diseases 18.2 mL 5   • lamoTRIgine (LaMICtal) 25 MG tablet      • Latuda 80 MG tablet tablet      • pantoprazole (PROTONIX) 40 MG EC tablet Take 1 tablet by mouth Daily. Indications: Gastroesophageal Reflux Disease 30 tablet 5   • " sertraline (ZOLOFT) 100 MG tablet Take 1 tablet by mouth 2 (Two) Times a Day. Indications: Major Depressive Disorder 60 tablet 1   • simvastatin (ZOCOR) 10 MG tablet Take 1 tablet by mouth Every Night. Indications: High Amount of Fats in the Blood 30 tablet 5   • traZODone (DESYREL) 150 MG tablet Take 1 tablet by mouth Every Night. Indications: Trouble Sleeping 30 tablet 1   • Vivitrol 380 MG reconstituted suspension IM suspension ADMINISTER 4 ML INTRAMUSCULARLY ONCE A MONTH     • albuterol (ACCUNEB) 0.63 MG/3ML nebulizer solution Take 3 mL by nebulization Every 6 (Six) Hours As Needed for Wheezing. 120 each 0   • cyclobenzaprine (FLEXERIL) 5 MG tablet Take 1 tablet by mouth At Night As Needed for Muscle Spasms. 30 tablet 1   • ibuprofen (ADVIL,MOTRIN) 600 MG tablet Take 1 tablet by mouth Every 6 (Six) Hours As Needed for Mild Pain . 30 tablet 45   • loratadine (Claritin) 10 MG tablet Take 1 tablet by mouth Daily. 30 tablet 5   • metoprolol succinate XL (Toprol XL) 50 MG 24 hr tablet Take 1 tablet by mouth Daily. 30 tablet 1     No current facility-administered medications for this visit.            Assessment & Plan     Problem List Items Addressed This Visit        Gastrointestinal Abdominal     Acid reflux    Relevant Medications    pantoprazole (PROTONIX) 40 MG EC tablet      Other Visit Diagnoses     Essential hypertension    -  Primary    Relevant Medications    metoprolol succinate XL (Toprol XL) 50 MG 24 hr tablet    Other Relevant Orders    CBC & Differential    Comprehensive Metabolic Panel    Seasonal allergies  (Chronic)       Relevant Medications    fluticasone (Flonase) 50 MCG/ACT nasal spray    loratadine (Claritin) 10 MG tablet    Exercise-induced asthma  (Chronic)       Relevant Medications    albuterol sulfate  (90 Base) MCG/ACT inhaler    albuterol (ACCUNEB) 0.63 MG/3ML nebulizer solution    Other Relevant Orders    Home Nebulizer    Home Nebulizer Accessories    Mixed hyperlipidemia         Relevant Medications    ezetimibe (ZETIA) 10 MG tablet    simvastatin (ZOCOR) 10 MG tablet    Other Relevant Orders    Lipid Panel    Screening for thyroid disorder        Relevant Orders    TSH    Chronic low back pain, unspecified back pain laterality, unspecified whether sciatica present        Relevant Medications    ibuprofen (ADVIL,MOTRIN) 600 MG tablet    cyclobenzaprine (FLEXERIL) 5 MG tablet            ICD-10-CM ICD-9-CM   1. Essential hypertension  I10 401.9   2. Seasonal allergies  J30.2 477.9   3. Exercise-induced asthma  J45.990 493.81   4. Mixed hyperlipidemia  E78.2 272.2   5. Screening for thyroid disorder  Z13.29 V77.0   6. Gastroesophageal reflux disease, unspecified whether esophagitis present  K21.9 530.81   7. Chronic low back pain, unspecified back pain laterality, unspecified whether sciatica present  M54.50 724.2    G89.29 338.29       Plan: Get labs today. Increase metoprolol to 50mg po daily for better blood pressure control. Ibuprofen ordered only as needed for back pain. Cyclobenzaprine ordered as needed for back pain. Follow up in one month and as needed.     @Body mass index is 36.14 kg/m².                Understands disease processes and need for medications.  Understands reasons for urgent and emergent care.  Patient (& family) verbalized agreement for treatment plan.   Emotional support and active listening provided.  Patient provided time to verbalize feelings.           Class 2 Severe Obesity (BMI >=35 and <=39.9). Obesity-related health conditions include the following: hypertension and dyslipidemias. Obesity is unchanged. BMI is is above average; BMI management plan is completed. We discussed info included in summary.      This document has been electronically signed by CHARLIE Salinas   May 25, 2022 10:37 EDT

## 2022-05-26 NOTE — PROGRESS NOTES
Good cholesterol is a little low. Can improve with exercise and activity. Bad cholesterol is good. Continue same dose of cholesterol medicine. Other labs are unremarkable.

## 2022-06-02 DIAGNOSIS — J45.990 EXERCISE-INDUCED ASTHMA: Chronic | ICD-10-CM

## 2022-06-02 NOTE — TELEPHONE ENCOUNTER
Caller: RAGHAVENDRA SEXTON    Relationship: SELF    Best call back number: [phone]    Requested Prescriptions:   Requested Prescriptions     Pending Prescriptions Disp Refills   • albuterol (ACCUNEB) 0.63 MG/3ML nebulizer solution 120 each 0     Sig: Take 3 mL by nebulization Every 6 (Six) Hours As Needed for Wheezing.        Pharmacy where request should be sent: [facility] - [place], [place] - [address] - [phone] [facility] [phone] FX     Additional details provided by patient:     Does the patient have less than a 3 day supply:  [x] Yes  [] No    Chaparrita Weems   06/02/22 10:04 EDT

## 2022-06-03 RX ORDER — ALBUTEROL SULFATE 0.63 MG/3ML
1 SOLUTION RESPIRATORY (INHALATION) EVERY 6 HOURS PRN
Qty: 120 EACH | Refills: 0 | OUTPATIENT
Start: 2022-06-03

## 2022-07-05 ENCOUNTER — TELEPHONE (OUTPATIENT)
Dept: FAMILY MEDICINE CLINIC | Facility: CLINIC | Age: 37
End: 2022-07-05

## 2022-08-03 DIAGNOSIS — I10 ESSENTIAL HYPERTENSION: Chronic | ICD-10-CM

## 2022-08-03 RX ORDER — METOPROLOL SUCCINATE 50 MG/1
50 TABLET, EXTENDED RELEASE ORAL DAILY
Qty: 90 TABLET | OUTPATIENT
Start: 2022-08-03

## 2022-08-03 RX ORDER — METOPROLOL SUCCINATE 50 MG/1
50 TABLET, EXTENDED RELEASE ORAL DAILY
Qty: 30 TABLET | Refills: 1 | Status: SHIPPED | OUTPATIENT
Start: 2022-08-03